# Patient Record
Sex: FEMALE | Race: WHITE | NOT HISPANIC OR LATINO | Employment: FULL TIME | URBAN - METROPOLITAN AREA
[De-identification: names, ages, dates, MRNs, and addresses within clinical notes are randomized per-mention and may not be internally consistent; named-entity substitution may affect disease eponyms.]

---

## 2016-11-30 LAB
ABO + RH BLD: NORMAL
ABO + RH BLD: NORMAL
BLD GP AB SCN SERPL QL: NORMAL
CULTURE MICRO: NEGATIVE
HBV SURFACE AG SERPL QL IA: NEGATIVE
HEMOGLOBIN: 12.7 G/DL (ref 11.7–15.7)
HIV 1+2 AB+HIV1 P24 AG SERPL QL IA: NEGATIVE
PLATELET # BLD AUTO: 293 10^9/L
RUBELLA ANTIBODY IGG QUANTITATIVE: NORMAL IU/ML
T PALLIDUM IGG SER QL: NORMAL

## 2017-03-17 ENCOUNTER — OFFICE VISIT (OUTPATIENT)
Dept: FAMILY MEDICINE | Facility: CLINIC | Age: 32
End: 2017-03-17
Payer: COMMERCIAL

## 2017-03-17 VITALS
DIASTOLIC BLOOD PRESSURE: 74 MMHG | SYSTOLIC BLOOD PRESSURE: 110 MMHG | BODY MASS INDEX: 26.59 KG/M2 | WEIGHT: 175.44 LBS | TEMPERATURE: 99.3 F | HEIGHT: 68 IN | OXYGEN SATURATION: 100 % | HEART RATE: 75 BPM

## 2017-03-17 DIAGNOSIS — J98.01 ACUTE BRONCHOSPASM: ICD-10-CM

## 2017-03-17 DIAGNOSIS — J06.9 VIRAL URI WITH COUGH: Primary | ICD-10-CM

## 2017-03-17 PROCEDURE — 99214 OFFICE O/P EST MOD 30 MIN: CPT | Performed by: FAMILY MEDICINE

## 2017-03-17 RX ORDER — ALBUTEROL SULFATE 90 UG/1
2 AEROSOL, METERED RESPIRATORY (INHALATION) 3 TIMES DAILY PRN
Qty: 1 INHALER | Refills: 1 | Status: ON HOLD | OUTPATIENT
Start: 2017-03-17 | End: 2019-03-20

## 2017-03-17 NOTE — PATIENT INSTRUCTIONS
Viral upper respiratiry tract infection  Steam inhalation- once daily if that helps  Albuterol inhaler three times daily as needed   Wheezing  Tylenol/acetaminophen 1-2 tabs up to three times daily as needed  FOR NEXT 3-5 DAYS  NO IBUPROFEN / NAPROXEN  Call if not getting better

## 2017-03-17 NOTE — MR AVS SNAPSHOT
After Visit Summary   3/17/2017    Melissa Aguirre    MRN: [id number]           Patient Information     Date Of Birth          1985        Visit Information        Provider Department      3/17/2017 2:00 PM Ynes Cardenas MD [facility]        Today's Diagnoses     Viral URI with cough    -  1    Acute bronchospasm          Care Instructions    Viral upper respiratiry tract infection  Steam inhalation- once daily if that helps  Albuterol inhaler three times daily as needed   Wheezing  Tylenol/acetaminophen 1-2 tabs up to three times daily as needed  FOR NEXT 3-5 DAYS  NO IBUPROFEN / NAPROXEN  Call if not getting better          Follow-ups after your visit        Who to contact     If you have questions or need follow up information about today's clinic visit or your schedule please contact [facility] directly at [phone].  Normal or non-critical lab and imaging results will be communicated to you by MyDochart, letter or phone within 4 business days after the clinic has received the results. If you do not hear from us within 7 days, please contact the clinic through MyDochart or phone. If you have a critical or abnormal lab result, we will notify you by phone as soon as possible.  Submit refill requests through HereOrThere or call your pharmacy and they will forward the refill request to us. Please allow 3 business days for your refill to be completed.          Additional Information About Your Visit        MyChart Information     HereOrThere gives you secure access to your electronic health record. If you see a primary care provider, you can also send messages to your care team and make appointments. If you have questions, please call your primary care clinic.  If you do not have a primary care provider, please call [phone] and they will assist you.        Care EveryWhere ID     This is your Care EveryWhere ID. This could be used by other organizations to access your [place]  "medical records  [id number]        Your Vitals Were     Pulse Temperature Height Last Period Pulse Oximetry Breastfeeding?    75 99.3  F (37.4  C) (Tympanic) 5' 8\" (1.727 m) 09/11/2016 100% No    BMI (Body Mass Index)                   26.68 kg/m2            Blood Pressure from Last 3 Encounters:   03/17/17 110/74   11/22/16 104/66   10/14/16 128/80    Weight from Last 3 Encounters:   03/17/17 175 lb 7 oz (79.6 kg)   11/22/16 154 lb 9.6 oz (70.1 kg)   10/14/16 159 lb (72.1 kg)              Today, you had the following     No orders found for display         Today's Medication Changes          These changes are accurate as of: 3/17/17  2:29 PM.  If you have any questions, ask your nurse or doctor.               Start taking these medicines.        Dose/Directions    albuterol 108 (90 BASE) MCG/ACT Inhaler   Commonly known as:  PROAIR HFA/PROVENTIL HFA/VENTOLIN HFA   Used for:  Acute bronchospasm   Started by:  Ynes Cardenas MD        Dose:  2 puff   Inhale 2 puffs into the lungs 3 times daily as needed for shortness of breath / dyspnea or wheezing   Quantity:  1 Inhaler   Refills:  1            Where to get your medicines      These medications were sent to [place] [id number] [place] [facility] [address]  [address] [facility] [address]     Phone:  [phone]     albuterol 108 (90 BASE) MCG/ACT Inhaler                Primary Care Provider Office Phone # Fax #    Taylor Amato [phone]       [address] [address]        Thank you!     Thank you for choosing [facility]  for your care. Our goal is always to provide you with excellent care. Hearing back from our patients is one way we can continue to improve our services. Please take a few minutes to complete the written survey that you may receive in the mail after your visit with us. Thank you!             Your Updated Medication List - Protect others around you: Learn how to " safely use, store and throw away your medicines at www.disposemymeds.org.          This list is accurate as of: 3/17/17  2:29 PM.  Always use your most recent med list.                   Brand Name Dispense Instructions for use    albuterol 108 (90 BASE) MCG/ACT Inhaler    PROAIR HFA/PROVENTIL HFA/VENTOLIN HFA    1 Inhaler    Inhale 2 puffs into the lungs 3 times daily as needed for shortness of breath / dyspnea or wheezing       prenatal multivitamin  plus iron 27-0.8 MG Tabs per tablet     100 tablet    Take 1 tablet by mouth daily

## 2017-03-17 NOTE — PROGRESS NOTES
"  SUBJECTIVE:                                                    Melissa Aguirre is a 31 year old female who presents to clinic today for the following health issues:      RESPIRATORY SYMPTOMS      Duration:   Dry cough with wheezing, scant sputum. No asthma, allergies, nonsmoker    Description  nasal congestion and  SOB     Severity: mild    Accompanying signs and symptoms: Chest Tightness    History (predisposing factors):  26 weeks pregnant    Precipitating or alleviating factors: walking    Therapies tried and outcome:  Guaifenesin and Tylenol last  Dose last night     Expected date of delivery 6/18/17    Uneventful pregnancy    AWAKE AFTER RIBUTSSIN                 Reviewed and updated as needed this visit by Provider           :  Chief Complaint   Patient presents with     Cold Symptoms         Current Outpatient Prescriptions   Medication     albuterol (PROAIR HFA/PROVENTIL HFA/VENTOLIN HFA) 108 (90 BASE) MCG/ACT Inhaler     Prenatal Vit-Fe Fumarate-FA (PRENATAL MULTIVITAMIN  PLUS IRON) 27-0.8 MG TABS     No current facility-administered medications for this visit.        Past medical and family history,medications, allergies and problem list reviewed       ROS: 10 point ROS neg other than the symptoms noted above in the HPI.    EXAM:/74 (BP Location: Left arm, Patient Position: Left side, Cuff Size: Adult Regular)  Pulse 75  Temp 99.3  F (37.4  C) (Tympanic)  Ht 5' 8\" (1.727 m)  Wt 175 lb 7 oz (79.6 kg)  LMP 09/11/2016  SpO2 100%  Breastfeeding? No  BMI 26.68 kg/m2  Constitutional: healthy, alert and no distress   Head: Normocephalic. No masses, lesions, tenderness or abnormalities  Eyes; PERRLA EOMI  Neck: Neck supple. No adenopathy. Thyroid symmetric, normal size  Ear,Nose, clear . Throat: normal, oropharynx  Cardiovascular: negative,  RRR. No murmurs, clicks gallops or rub  Respiratory:  Lungs clear. Does have scattered wheezing, No crackles.  Abdomen: gravid. Height of fundus 26. Good fetal " heart tones  NEURO: Gait normal. Reflexes normal and symmetric. Sensation grossly WNL.  Psychiatric: mentation appears normal and affect normal/bright    ASSESSMENT / PLAN:  (J06.9,  B97.89) Viral URI with cough  (primary encounter diagnosis)and (J98.01) Acute bronchospasm  Plan:Viral upper respiratiry tract infection , symptomatic treatment with   Steam inhalation-, rest , fluids . Due to wheezing, start Albuterol inhaler three times daily as needed   Wheezing  Tylenol/acetaminophen 1-2 tabs up to three times daily as needed    Call if not getting better  No need for antibiotic     She is 26 weeks pregnant and is advised to keep routine follow up with OBG/GYN         Potential medication side effects were discussed with the patient; let me know if any occur.    The patient indicates understanding of these issues and agrees with the plan.      Ynes Cardenas

## 2017-03-17 NOTE — NURSING NOTE
"Chief Complaint   Patient presents with     Cold Symptoms     /74 (BP Location: Left arm, Patient Position: Left side, Cuff Size: Adult Regular)  Pulse 75  Temp 99.3  F (37.4  C) (Tympanic)  Ht 5' 8\" (1.727 m)  Wt 175 lb 7 oz (79.6 kg)  LMP 09/11/2016  SpO2 100%  Breastfeeding? No  BMI 26.68 kg/m2 Estimated body mass index is 26.68 kg/(m^2) as calculated from the following:    Height as of this encounter: 5' 8\" (1.727 m).    Weight as of this encounter: 175 lb 7 oz (79.6 kg).  bp completed using cuff size: regular      Health Maintenance addressed:  NONE    n/a              "

## 2017-05-26 LAB — GROUP B STREP PCR: NEGATIVE

## 2017-06-11 ENCOUNTER — HOSPITAL ENCOUNTER (OUTPATIENT)
Facility: CLINIC | Age: 32
Discharge: HOME | End: 2017-06-11
Attending: OBSTETRICS & GYNECOLOGY | Admitting: OBSTETRICS & GYNECOLOGY
Payer: COMMERCIAL

## 2017-06-11 VITALS
DIASTOLIC BLOOD PRESSURE: 75 MMHG | BODY MASS INDEX: 29.55 KG/M2 | HEIGHT: 68 IN | RESPIRATION RATE: 16 BRPM | TEMPERATURE: 98.4 F | WEIGHT: 195 LBS | SYSTOLIC BLOOD PRESSURE: 129 MMHG

## 2017-06-11 PROCEDURE — 59025 FETAL NON-STRESS TEST: CPT

## 2017-06-11 PROCEDURE — 99213 OFFICE O/P EST LOW 20 MIN: CPT | Mod: 25

## 2017-06-11 RX ORDER — ONDANSETRON 2 MG/ML
4 INJECTION INTRAMUSCULAR; INTRAVENOUS EVERY 6 HOURS PRN
Status: DISCONTINUED | OUTPATIENT
Start: 2017-06-11 | End: 2017-06-11 | Stop reason: HOSPADM

## 2017-06-11 NOTE — IP AVS SNAPSHOT
[facility] Labor and Delivery    [address] [place] [address]    Phone:  [phone]                                       After Visit Summary   6/11/2017    Melissa Aguirre    MRN: [id number]           After Visit Summary Signature Page     I have received my discharge instructions, and my questions have been answered. I have discussed any challenges I see with this plan with the nurse or doctor.    ..........................................................................................................................................  Patient/Patient Representative Signature      ..........................................................................................................................................  Patient Representative Print Name and Relationship to Patient    ..................................................               ................................................  Date                                            Time    ..........................................................................................................................................  Reviewed by Signature/Title    ...................................................              ..............................................  Date                                                            Time

## 2017-06-11 NOTE — DISCHARGE INSTRUCTIONS
Discharge Instruction for Undelivered Patients      You were seen for: Labor Assessment/left sided abdominal pain  We Consulted: Dr. Harrison  You had (Test or Medicine):non stress test monitoring, cervical exam     Diet:   Drink 8 to 12 glasses of liquids (milk, juice, water) every day.  You may eat meals and snacks.     Activity:  Rest.  Activity as tolerated.  Can try warm baths or shower.   Count fetal kicks everyday (see handout)  Call your doctor or nurse midwife if your baby is moving less than usual.     Call your provider if you notice:  Pre-eclampsia symptoms:        Swelling in your face or increased swelling in your hands or legs.        Headaches that are not relieved by Tylenol (acetaminophen).        Changes in your vision (blurring: seeing spots or stars.)        Nausea (sick to your stomach) and vomiting (throwing up).         Weight gain of 5 pounds or more per week.        Heartburn that doesn't go away.  Signs of bladder infection: pain when you urinate (use the toilet), need to go more often and more urgently.  The bag of santoyo (rupture of membranes) breaks, or you notice leaking in your underwear.  Bright red blood in your underwear.  Abdominal (lower belly) or stomach pain--MD wants you to rest and monitor left sided pain this evening and call MD if you feel it has worsened or start having any other symptoms/concerns.  For first baby: Contractions (tightening) less than 5 minutes apart for one hour or more.  Increase or change in vaginal discharge (note the color and amount)      Follow-up:  As scheduled in the clinicon Friday.  If pain continues and you don't feel that it is getting better, then make an appointment to be seen in clinic sooner.

## 2017-06-11 NOTE — IP AVS SNAPSHOT
MRN:[id number]                      After Visit Summary   6/11/2017    Melissa Aguirre    MRN: [id number]           Thank you!     Thank you for choosing [facility] for your care. Our goal is always to provide you with excellent care. Hearing back from our patients is one way we can continue to improve our services. Please take a few minutes to complete the written survey that you may receive in the mail after you visit with us. Thank you!        Patient Information     Date Of Birth          1985        Designated Caregiver       Most Recent Value    Caregiver    Will someone help with your care after discharge? yes    Name of designated caregiver Quinn      About your hospital stay     You were admitted on:  June 11, 2017 You last received care in the:  [facility] Labor and Delivery    You were discharged on:  June 11, 2017       Who to Call     For medical emergencies, please call 911.  For non-urgent questions about your medical care, please call your primary care provider or clinic, [phone]          Attending Provider     Provider Carolynn Holliday MD OB/Gyn       Primary Care Provider Office Phone # Fax #    Taylor Johana Amato [phone]      Further instructions from your care team       Discharge Instruction for Undelivered Patients      You were seen for: Labor Assessment/left sided abdominal pain  We Consulted: Dr. Harrison  You had (Test or Medicine):non stress test monitoring, cervical exam     Diet:   Drink 8 to 12 glasses of liquids (milk, juice, water) every day.  You may eat meals and snacks.     Activity:  Rest.  Activity as tolerated.  Can try warm baths or shower.   Count fetal kicks everyday (see handout)  Call your doctor or nurse midwife if your baby is moving less than usual.     Call your provider if you notice:  Pre-eclampsia symptoms:        Swelling in your face or increased swelling in your hands or legs.        Headaches  "that are not relieved by Tylenol (acetaminophen).        Changes in your vision (blurring: seeing spots or stars.)        Nausea (sick to your stomach) and vomiting (throwing up).         Weight gain of 5 pounds or more per week.        Heartburn that doesn't go away.  Signs of bladder infection: pain when you urinate (use the toilet), need to go more often and more urgently.  The bag of santoyo (rupture of membranes) breaks, or you notice leaking in your underwear.  Bright red blood in your underwear.  Abdominal (lower belly) or stomach pain--MD wants you to rest and monitor left sided pain this evening and call MD if you feel it has worsened or start having any other symptoms/concerns.  For first baby: Contractions (tightening) less than 5 minutes apart for one hour or more.  Increase or change in vaginal discharge (note the color and amount)      Follow-up:  As scheduled in the clinicon Friday.  If pain continues and you don't feel that it is getting better, then make an appointment to be seen in clinic sooner.          Pending Results     No orders found from 6/9/2017 to 6/12/2017.            Admission Information     Date & Time Provider Department Dept. Phone    6/11/2017 Carolynn Harrison MD [facility] Labor and Delivery [phone]      Your Vitals Were     Blood Pressure Temperature Respirations Height Weight Last Period    129/75 98.4  F (36.9  C) (Temporal) 16 1.727 m (5' 8\") 88.5 kg (195 lb) 09/11/2016    BMI (Body Mass Index)                   29.65 kg/m2           [employer] Information     [employer] gives you secure access to your electronic health record. If you see a primary care provider, you can also send messages to your care team and make appointments. If you have questions, please call your primary care clinic.  If you do not have a primary care provider, please call [phone] and they will assist you.        Care EveryWhere ID     This is your Care EveryWhere ID. This could be used " by other organizations to access your [place] medical records  [id number]           Review of your medicines      UNREVIEWED medicines. Ask your doctor about these medicines        Dose / Directions    albuterol 108 (90 BASE) MCG/ACT Inhaler   Commonly known as:  PROAIR HFA/PROVENTIL HFA/VENTOLIN HFA   Used for:  Acute bronchospasm        Dose:  2 puff   Inhale 2 puffs into the lungs 3 times daily as needed for shortness of breath / dyspnea or wheezing   Quantity:  1 Inhaler   Refills:  1       prenatal multivitamin  plus iron 27-0.8 MG Tabs per tablet        Dose:  1 tablet   Take 1 tablet by mouth daily   Quantity:  100 tablet   Refills:  3                Protect others around you: Learn how to safely use, store and throw away your medicines at www.disposemymeds.org.             Medication List: This is a list of all your medications and when to take them. Check marks below indicate your daily home schedule. Keep this list as a reference.      Medications           Morning Afternoon Evening Bedtime As Needed    albuterol 108 (90 BASE) MCG/ACT Inhaler   Commonly known as:  PROAIR HFA/PROVENTIL HFA/VENTOLIN HFA   Inhale 2 puffs into the lungs 3 times daily as needed for shortness of breath / dyspnea or wheezing                                prenatal multivitamin  plus iron 27-0.8 MG Tabs per tablet   Take 1 tablet by mouth daily

## 2017-06-11 NOTE — PLAN OF CARE
"1558:  Primip triaged in rm 235.  Pt came in to be evaluated for c/o left sided \"sharp\" abdominal pain that started about \"45 minutes ago\" while out for a walk.  Stated that the pain is worse when she moves and goes away with resting still. Pt reports having asuncion-foreman contractions for many days and denies stronger contractions.  Pt stated feeling baby movements.  Pt denies vaginal bleeding, visual disturbances, epigastric pain, or increased swelling.  No edema to ankles, has +1 edema to shins.  Reflexes +2, no clonus.  Pt denies burning with urination.  Pt reports normal BMs.   Discussed POC.  Pt and  wish to proceed.  Monitors applied.  Abdomen non tender to palpation.  History obtained. Pt stated cervix on Fri was 2/60%.  1620:  Couple BH contractions on monitor, which pt able to talk through.  SVE by RN.  Cervix 1+/60/-2. No vaginal discharge or bleeding noted.   Pt's description of pain sounds musculoskeletal, like a side ache or pulled muscle.  Will update MD for any additional orders.  Cat 1 FHT.  "

## 2017-06-11 NOTE — PLAN OF CARE
Data: [facility]   Reason for maternal/fetal assessment per patient is Abdominal Pain (left sided)  . Patient is a . Prenatal record reviewed.      Obstetric History       T0      L0     SAB0   TAB0   Ectopic0   Multiple0   Live Births0       # Outcome Date GA Lbr Silvestre/2nd Weight Sex Delivery Anes PTL Lv   1 Current                  Medical History:   Past Medical History:   Diagnosis Date     IUD (intrauterine device) in place -    actinomyces seen on pap 2012-pt asymptomatic      NO ACTIVE PROBLEMS    . Gestational Age 39w0d. VSS. Cervix: dilated to 1+/60/-2.  Fetal movement present. Patient denies cramping, backache, vaginal discharge, pelvic pressure, UTI symptoms, GI problems, bloody show, vaginal bleeding, edema, headache, visual disturbances, epigastric or URQ pain, abdominal pain, rupture of membranes. Support persons Quinn present.  Action: Verbal consent for EFM. Triage assessment completed. EFM applied for NST. Uterine assessment abd soft and non tender to palpation.  Having asuncion foreman contractions on monitor, which pt reports she's been having for many days. Fetal assessment: Presumed adequate fetal oxygenation documented (see flow record). Patient education on fetal movement counts reviewed. Patient instructed to report change in fetal movement, vaginal leaking of fluid or bleeding, abdominal pain, or any concerns related to the pregnancy to her nurse/physician.   Response: Dr. Harrison informed of pt status, SVE, NST, BH contractions. Plan per provider is discharge to home to rest.  Pt to call MD if discomfort worsens and to been seen in clinic sooner than Fri if she feels symptoms don't improve with rest. Patient verbalized understanding of education and verbalized agreement with plan. Discharged ambulatory at 1705.

## 2017-06-11 NOTE — PROVIDER NOTIFICATION
MD updated on pt status.  MD feels pt should go home, rest, and monitor discomfort.  MD stated that pt should be seen in clinic if pain worsens before her scheduled appointment on Fri.  Pt notified and is happy to be able to go home.  She quickly sat up in bed without any obvious signs of discomfort.

## 2017-06-17 ENCOUNTER — ANESTHESIA EVENT (OUTPATIENT)
Dept: OBGYN | Facility: CLINIC | Age: 32
End: 2017-06-17
Payer: COMMERCIAL

## 2017-06-17 ENCOUNTER — ANESTHESIA (OUTPATIENT)
Dept: OBGYN | Facility: CLINIC | Age: 32
End: 2017-06-17
Payer: COMMERCIAL

## 2017-06-17 ENCOUNTER — HOSPITAL ENCOUNTER (INPATIENT)
Facility: CLINIC | Age: 32
LOS: 3 days | Discharge: HOME | End: 2017-06-20
Attending: OBSTETRICS & GYNECOLOGY | Admitting: OBSTETRICS & GYNECOLOGY
Payer: COMMERCIAL

## 2017-06-17 LAB
ABO + RH BLD: NORMAL
ABO + RH BLD: NORMAL
PLATELET # BLD AUTO: 280 10E9/L (ref 150–450)
SPECIMEN EXP DATE BLD: NORMAL

## 2017-06-17 PROCEDURE — 3E0R3CZ INTRODUCTION OF REGIONAL ANESTHETIC INTO SPINAL CANAL, PERCUTANEOUS APPROACH: ICD-10-PCS | Performed by: ANESTHESIOLOGY

## 2017-06-17 PROCEDURE — 99215 OFFICE O/P EST HI 40 MIN: CPT | Mod: 25

## 2017-06-17 PROCEDURE — 25000128 H RX IP 250 OP 636: Performed by: ANESTHESIOLOGY

## 2017-06-17 PROCEDURE — 00HU33Z INSERTION OF INFUSION DEVICE INTO SPINAL CANAL, PERCUTANEOUS APPROACH: ICD-10-PCS | Performed by: ANESTHESIOLOGY

## 2017-06-17 PROCEDURE — 86901 BLOOD TYPING SEROLOGIC RH(D): CPT | Performed by: OBSTETRICS & GYNECOLOGY

## 2017-06-17 PROCEDURE — 37000011 ZZH ANESTHESIA WARD SERVICE

## 2017-06-17 PROCEDURE — 25000125 ZZHC RX 250: Performed by: ANESTHESIOLOGY

## 2017-06-17 PROCEDURE — 85049 AUTOMATED PLATELET COUNT: CPT | Performed by: OBSTETRICS & GYNECOLOGY

## 2017-06-17 PROCEDURE — 59025 FETAL NON-STRESS TEST: CPT

## 2017-06-17 PROCEDURE — 86780 TREPONEMA PALLIDUM: CPT | Performed by: OBSTETRICS & GYNECOLOGY

## 2017-06-17 PROCEDURE — 12000029 ZZH R&B OB INTERMEDIATE

## 2017-06-17 PROCEDURE — 86900 BLOOD TYPING SEROLOGIC ABO: CPT | Performed by: OBSTETRICS & GYNECOLOGY

## 2017-06-17 PROCEDURE — 25000128 H RX IP 250 OP 636: Performed by: OBSTETRICS & GYNECOLOGY

## 2017-06-17 PROCEDURE — 36415 COLL VENOUS BLD VENIPUNCTURE: CPT | Performed by: OBSTETRICS & GYNECOLOGY

## 2017-06-17 RX ORDER — CARBOPROST TROMETHAMINE 250 UG/ML
250 INJECTION, SOLUTION INTRAMUSCULAR
Status: DISCONTINUED | OUTPATIENT
Start: 2017-06-17 | End: 2017-06-18

## 2017-06-17 RX ORDER — OXYCODONE AND ACETAMINOPHEN 5; 325 MG/1; MG/1
1 TABLET ORAL
Status: DISCONTINUED | OUTPATIENT
Start: 2017-06-17 | End: 2017-06-18

## 2017-06-17 RX ORDER — EPHEDRINE SULFATE 50 MG/ML
5 INJECTION, SOLUTION INTRAMUSCULAR; INTRAVENOUS; SUBCUTANEOUS
Status: DISCONTINUED | OUTPATIENT
Start: 2017-06-17 | End: 2017-06-18

## 2017-06-17 RX ORDER — IBUPROFEN 800 MG/1
800 TABLET, FILM COATED ORAL
Status: DISCONTINUED | OUTPATIENT
Start: 2017-06-17 | End: 2017-06-18

## 2017-06-17 RX ORDER — ROPIVACAINE HYDROCHLORIDE 2 MG/ML
10 INJECTION, SOLUTION EPIDURAL; INFILTRATION; PERINEURAL ONCE
Status: COMPLETED | OUTPATIENT
Start: 2017-06-17 | End: 2017-06-17

## 2017-06-17 RX ORDER — FENTANYL CITRATE 50 UG/ML
100 INJECTION, SOLUTION INTRAMUSCULAR; INTRAVENOUS ONCE
Status: COMPLETED | OUTPATIENT
Start: 2017-06-17 | End: 2017-06-17

## 2017-06-17 RX ORDER — SODIUM CHLORIDE, SODIUM LACTATE, POTASSIUM CHLORIDE, CALCIUM CHLORIDE 600; 310; 30; 20 MG/100ML; MG/100ML; MG/100ML; MG/100ML
INJECTION, SOLUTION INTRAVENOUS CONTINUOUS
Status: DISCONTINUED | OUTPATIENT
Start: 2017-06-17 | End: 2017-06-18

## 2017-06-17 RX ORDER — NALOXONE HYDROCHLORIDE 0.4 MG/ML
.1-.4 INJECTION, SOLUTION INTRAMUSCULAR; INTRAVENOUS; SUBCUTANEOUS
Status: DISCONTINUED | OUTPATIENT
Start: 2017-06-17 | End: 2017-06-18

## 2017-06-17 RX ORDER — ONDANSETRON 2 MG/ML
4 INJECTION INTRAMUSCULAR; INTRAVENOUS EVERY 6 HOURS PRN
Status: DISCONTINUED | OUTPATIENT
Start: 2017-06-17 | End: 2017-06-18

## 2017-06-17 RX ORDER — OXYTOCIN 10 [USP'U]/ML
10 INJECTION, SOLUTION INTRAMUSCULAR; INTRAVENOUS
Status: DISCONTINUED | OUTPATIENT
Start: 2017-06-17 | End: 2017-06-18

## 2017-06-17 RX ORDER — NALBUPHINE HYDROCHLORIDE 10 MG/ML
2.5-5 INJECTION, SOLUTION INTRAMUSCULAR; INTRAVENOUS; SUBCUTANEOUS EVERY 6 HOURS PRN
Status: DISCONTINUED | OUTPATIENT
Start: 2017-06-17 | End: 2017-06-18

## 2017-06-17 RX ORDER — METHYLERGONOVINE MALEATE 0.2 MG/ML
200 INJECTION INTRAVENOUS
Status: DISCONTINUED | OUTPATIENT
Start: 2017-06-17 | End: 2017-06-18

## 2017-06-17 RX ORDER — ACETAMINOPHEN 325 MG/1
650 TABLET ORAL EVERY 4 HOURS PRN
Status: DISCONTINUED | OUTPATIENT
Start: 2017-06-17 | End: 2017-06-18

## 2017-06-17 RX ORDER — OXYTOCIN/0.9 % SODIUM CHLORIDE 30/500 ML
100-340 PLASTIC BAG, INJECTION (ML) INTRAVENOUS CONTINUOUS PRN
Status: DISCONTINUED | OUTPATIENT
Start: 2017-06-17 | End: 2017-06-18

## 2017-06-17 RX ADMIN — SODIUM CHLORIDE, POTASSIUM CHLORIDE, SODIUM LACTATE AND CALCIUM CHLORIDE 1000 ML: 600; 310; 30; 20 INJECTION, SOLUTION INTRAVENOUS at 20:30

## 2017-06-17 RX ADMIN — ROPIVACAINE HYDROCHLORIDE 3 ML: 2 INJECTION, SOLUTION EPIDURAL; INFILTRATION at 19:48

## 2017-06-17 RX ADMIN — FENTANYL CITRATE 100 MCG: 50 INJECTION, SOLUTION INTRAMUSCULAR; INTRAVENOUS at 19:50

## 2017-06-17 RX ADMIN — Medication 12 ML/HR: at 19:40

## 2017-06-17 RX ADMIN — ROPIVACAINE HYDROCHLORIDE 3 ML: 2 INJECTION, SOLUTION EPIDURAL; INFILTRATION at 19:49

## 2017-06-17 RX ADMIN — SODIUM CHLORIDE, POTASSIUM CHLORIDE, SODIUM LACTATE AND CALCIUM CHLORIDE 1000 ML: 600; 310; 30; 20 INJECTION, SOLUTION INTRAVENOUS at 19:21

## 2017-06-17 RX ADMIN — ROPIVACAINE HYDROCHLORIDE 4 ML: 2 INJECTION, SOLUTION EPIDURAL; INFILTRATION at 19:51

## 2017-06-17 NOTE — IP AVS SNAPSHOT
[address]    [place] [address]    Phone:  [phone]                                       After Visit Summary   6/17/2017    Melissa Aguirre    MRN: [id number]           After Visit Summary Signature Page     I have received my discharge instructions, and my questions have been answered. I have discussed any challenges I see with this plan with the nurse or doctor.    ..........................................................................................................................................  Patient/Patient Representative Signature      ..........................................................................................................................................  Patient Representative Print Name and Relationship to Patient    ..................................................               ................................................  Date                                            Time    ..........................................................................................................................................  Reviewed by Signature/Title    ...................................................              ..............................................  Date                                                            Time

## 2017-06-17 NOTE — IP AVS SNAPSHOT
MRN:[id number]                      After Visit Summary   6/17/2017    Melissa Aguirre    MRN: [id number]           Thank you!     Thank you for choosing [facility] for your care. Our goal is always to provide you with excellent care. Hearing back from our patients is one way we can continue to improve our services. Please take a few minutes to complete the written survey that you may receive in the mail after you visit with us. Thank you!        Patient Information     Date Of Birth          1985        About your hospital stay     You were admitted on:  June 17, 2017 You last received care in the:  [address]    You were discharged on:  June 20, 2017       Who to Call     For medical emergencies, please call 911.  For non-urgent questions about your medical care, please call your primary care provider or clinic, [phone]          Attending Provider     Provider Helen Meza MD OB/Gyn       Primary Care Provider Office Phone # Fax #    Taylor Amato [phone]      After Care Instructions     Activity       Review discharge instructions            Diet       Resume previous diet            Discharge Instructions - Postpartum visit       Schedule postpartum visit with your provider and return to clinic in 6 weeks.                  Further instructions from your care team       Postpartum Vaginal Delivery Instructions    Activity       Ask family and friends for help when you need it.    Do not place anything in your vagina for 6 weeks.    You are not restricted on other activities, but take it easy for a few weeks to allow your body to recover from delivery.  You are able to do any activities you feel up to that point.    No driving until you have stopped taking your pain medications (usually two weeks after delivery).     Call your health care provider if you have any of these symptoms:       Increased pain,  swelling, redness, or fluid around your stiches from an episiotomy or perineal tear.    A fever above 100.4 F (38 C) with or without chills when placing a thermometer under your tongue.    You soak a sanitary pad with blood within 1 hour, or you see blood clots larger than a golf ball.    Bleeding that lasts more than 6 weeks.    Vaginal discharge that smells bad.    Severe pain, cramping or tenderness in your lower belly area.    A need to urinate more frequently (use the toilet more often), more urgently (use the toilet very quickly), or it burns when you urinate.    Nausea and vomiting.    Redness, swelling or pain around a vein in your leg.    Problems breastfeeding or a red or painful area on your breast.    Chest pain and cough or are gasping for air.    Problems coping with sadness, anxiety, or depression.  If you have any concerns about hurting yourself or the baby, call your provider immediately.     You have questions or concerns after you return home.     Keep your hands clean:  Always wash your hands before touching your perineal area and stitches.  This helps reduce your risk of infection.  If your hands aren't dirty, you may use an alcohol hand-rub to clean your hands. Keep your nails clean and short.        Pending Results     No orders found from 6/15/2017 to 6/18/2017.            Statement of Approval     Ordered          06/20/17 0820  I have reviewed and agree with all the recommendations and orders detailed in this document.  EFFECTIVE NOW     Approved and electronically signed by:  Carolynn Harrison MD             Admission Information     Date & Time Provider Department Dept. Phone    6/17/2017 Helen Patterson MD [address] [phone]      Your Vitals Were     Blood Pressure Pulse Temperature Respirations Last Period Pulse Oximetry    119/78 85 97.7  F (36.5  C) (Oral) 16 09/11/2016 99%      MyChart Information     [facility] gives you secure  access to your electronic health record. If you see a primary care provider, you can also send messages to your care team and make appointments. If you have questions, please call your primary care clinic.  If you do not have a primary care provider, please call [phone] and they will assist you.        Care EveryWhere ID     This is your Care EveryWhere ID. This could be used by other organizations to access your [facility] medical records  [id number]           Review of your medicines      CONTINUE these medicines which have NOT CHANGED        Dose / Directions    albuterol 108 (90 BASE) MCG/ACT Inhaler   Commonly known as:  PROAIR HFA/PROVENTIL HFA/VENTOLIN HFA   Used for:  Acute bronchospasm        Dose:  2 puff   Inhale 2 puffs into the lungs 3 times daily as needed for shortness of breath / dyspnea or wheezing   Quantity:  1 Inhaler   Refills:  1       prenatal multivitamin  plus iron 27-0.8 MG Tabs per tablet        Dose:  1 tablet   Take 1 tablet by mouth daily   Quantity:  100 tablet   Refills:  3                Protect others around you: Learn how to safely use, store and throw away your medicines at www.disposemymeds.org.             Medication List: This is a list of all your medications and when to take them. Check marks below indicate your daily home schedule. Keep this list as a reference.      Medications           Morning Afternoon Evening Bedtime As Needed    albuterol 108 (90 BASE) MCG/ACT Inhaler   Commonly known as:  PROAIR HFA/PROVENTIL HFA/VENTOLIN HFA   Inhale 2 puffs into the lungs 3 times daily as needed for shortness of breath / dyspnea or wheezing                                prenatal multivitamin  plus iron 27-0.8 MG Tabs per tablet   Take 1 tablet by mouth daily

## 2017-06-18 LAB — T PALLIDUM IGG+IGM SER QL: NEGATIVE

## 2017-06-18 PROCEDURE — 0KQM0ZZ REPAIR PERINEUM MUSCLE, OPEN APPROACH: ICD-10-PCS | Performed by: OBSTETRICS & GYNECOLOGY

## 2017-06-18 PROCEDURE — 25000125 ZZHC RX 250: Performed by: OBSTETRICS & GYNECOLOGY

## 2017-06-18 PROCEDURE — 72200001 ZZH LABOR CARE VAGINAL DELIVERY SINGLE

## 2017-06-18 PROCEDURE — 12000037 ZZH R&B POSTPARTUM INTERMEDIATE

## 2017-06-18 PROCEDURE — 25000132 ZZH RX MED GY IP 250 OP 250 PS 637: Performed by: OBSTETRICS & GYNECOLOGY

## 2017-06-18 RX ORDER — OXYTOCIN/0.9 % SODIUM CHLORIDE 30/500 ML
100 PLASTIC BAG, INJECTION (ML) INTRAVENOUS CONTINUOUS
Status: DISCONTINUED | OUTPATIENT
Start: 2017-06-18 | End: 2017-06-20 | Stop reason: HOSPADM

## 2017-06-18 RX ORDER — AMOXICILLIN 250 MG
1-2 CAPSULE ORAL 2 TIMES DAILY
Status: DISCONTINUED | OUTPATIENT
Start: 2017-06-18 | End: 2017-06-20 | Stop reason: HOSPADM

## 2017-06-18 RX ORDER — LIDOCAINE 40 MG/G
CREAM TOPICAL
Status: DISCONTINUED | OUTPATIENT
Start: 2017-06-18 | End: 2017-06-18

## 2017-06-18 RX ORDER — MISOPROSTOL 200 UG/1
400 TABLET ORAL
Status: DISCONTINUED | OUTPATIENT
Start: 2017-06-18 | End: 2017-06-20 | Stop reason: HOSPADM

## 2017-06-18 RX ORDER — BISACODYL 10 MG
10 SUPPOSITORY, RECTAL RECTAL DAILY PRN
Status: DISCONTINUED | OUTPATIENT
Start: 2017-06-20 | End: 2017-06-20 | Stop reason: HOSPADM

## 2017-06-18 RX ORDER — ACETAMINOPHEN 325 MG/1
650 TABLET ORAL EVERY 4 HOURS PRN
Status: DISCONTINUED | OUTPATIENT
Start: 2017-06-18 | End: 2017-06-20 | Stop reason: HOSPADM

## 2017-06-18 RX ORDER — OXYTOCIN/0.9 % SODIUM CHLORIDE 30/500 ML
1-24 PLASTIC BAG, INJECTION (ML) INTRAVENOUS CONTINUOUS
Status: DISCONTINUED | OUTPATIENT
Start: 2017-06-18 | End: 2017-06-18

## 2017-06-18 RX ORDER — NALOXONE HYDROCHLORIDE 0.4 MG/ML
.1-.4 INJECTION, SOLUTION INTRAMUSCULAR; INTRAVENOUS; SUBCUTANEOUS
Status: DISCONTINUED | OUTPATIENT
Start: 2017-06-18 | End: 2017-06-20 | Stop reason: HOSPADM

## 2017-06-18 RX ORDER — LANOLIN 100 %
OINTMENT (GRAM) TOPICAL
Status: DISCONTINUED | OUTPATIENT
Start: 2017-06-18 | End: 2017-06-20 | Stop reason: HOSPADM

## 2017-06-18 RX ORDER — IBUPROFEN 400 MG/1
400-800 TABLET, FILM COATED ORAL EVERY 6 HOURS PRN
Status: DISCONTINUED | OUTPATIENT
Start: 2017-06-18 | End: 2017-06-20 | Stop reason: HOSPADM

## 2017-06-18 RX ORDER — HYDROCORTISONE 2.5 %
CREAM (GRAM) TOPICAL 3 TIMES DAILY PRN
Status: DISCONTINUED | OUTPATIENT
Start: 2017-06-18 | End: 2017-06-20 | Stop reason: HOSPADM

## 2017-06-18 RX ORDER — OXYTOCIN/0.9 % SODIUM CHLORIDE 30/500 ML
340 PLASTIC BAG, INJECTION (ML) INTRAVENOUS CONTINUOUS PRN
Status: DISCONTINUED | OUTPATIENT
Start: 2017-06-18 | End: 2017-06-20 | Stop reason: HOSPADM

## 2017-06-18 RX ORDER — OXYTOCIN 10 [USP'U]/ML
10 INJECTION, SOLUTION INTRAMUSCULAR; INTRAVENOUS
Status: DISCONTINUED | OUTPATIENT
Start: 2017-06-18 | End: 2017-06-20 | Stop reason: HOSPADM

## 2017-06-18 RX ADMIN — SENNOSIDES AND DOCUSATE SODIUM 1 TABLET: 8.6; 5 TABLET ORAL at 20:01

## 2017-06-18 RX ADMIN — ACETAMINOPHEN 650 MG: 325 TABLET, FILM COATED ORAL at 07:44

## 2017-06-18 RX ADMIN — ACETAMINOPHEN 650 MG: 325 TABLET, FILM COATED ORAL at 13:33

## 2017-06-18 RX ADMIN — ACETAMINOPHEN 650 MG: 325 TABLET, FILM COATED ORAL at 23:56

## 2017-06-18 RX ADMIN — ACETAMINOPHEN 650 MG: 325 TABLET, FILM COATED ORAL at 20:01

## 2017-06-18 RX ADMIN — IBUPROFEN 800 MG: 400 TABLET ORAL at 13:33

## 2017-06-18 RX ADMIN — IBUPROFEN 800 MG: 400 TABLET ORAL at 20:01

## 2017-06-18 RX ADMIN — Medication 2 MILLI-UNITS/MIN: at 01:45

## 2017-06-18 RX ADMIN — SENNOSIDES AND DOCUSATE SODIUM 1 TABLET: 8.6; 5 TABLET ORAL at 09:35

## 2017-06-18 RX ADMIN — IBUPROFEN 800 MG: 400 TABLET ORAL at 07:43

## 2017-06-18 NOTE — PLAN OF CARE
Problem: Labor (Cervical Ripen, Induct, Augment) (Adult,Obstetrics,Pediatric)  Goal: Signs and Symptoms of Listed Potential Problems Will be Absent or Manageable (Labor)  Signs and symptoms of listed potential problems will be absent or manageable by discharge/transition of care (reference Labor (Cervical Ripen, Induct, Augment) (Adult,Obstetrics,Pediatric) CPG).   Outcome: Improving  Making cervical changes  Comfortable with epidural  Excited about birth

## 2017-06-18 NOTE — PLAN OF CARE
Problem: Goal Outcome Summary  Goal: Goal Outcome Summary  Outcome: No Change  Vital signs stable. Up ad simon. Voiding without difficulties. Saline lock removed. Using ice and tucks. Taking tylenol and ibuprofen for pain control. Showered. Breast feeding attempts, doing skin to skin. Encouraged to call with any questions or concerns. Will continue to monitor.

## 2017-06-18 NOTE — PLAN OF CARE
Data: Melissa KIRKLAND Tozer up to BR, unable to void at this time, Uterus firm and slightly deviated to the right but has been her entire recovery, scant rubra, slightly dizzy when sitting up. Transferred to 414 via wheelchair at 0815. Baby transferred via parent's arms.  Action: Receiving unit notified of transfer: Yes. Patient and family notified of room change. Report given to Colleen RN at 0820. Belongings sent to receiving unit. Accompanied by Registered Nurse. Oriented patient to surroundings. Call light within reach.  Response: Patient tolerated transfer and is stable.

## 2017-06-18 NOTE — PLAN OF CARE
Pt having recurrent late and variable decels with pushing.  Pit off, O2 on, fluid bolus and reposition.  Dr Patterson called to room for delivery.  NNP called to room for standby for vacuum assisted delivery.

## 2017-06-18 NOTE — ANESTHESIA PROCEDURE NOTES
Peripheral nerve/Neuraxial procedure note : epidural catheter  Pre-Procedure  Performed by ANNA GREGORIO  Location: OB      Pre-Anesthestic Checklist: patient identified, IV checked, site marked, risks and benefits discussed, informed consent, monitors and equipment checked, pre-op evaluation, at physician/surgeon's request and post-op pain management    Timeout  Correct Patient: Yes   Correct Procedure: Yes   Correct Site: Yes   Correct Laterality: Yes   Correct Position: Yes   Site Marked: Yes   .   Procedure Documentation    Diagnosis:Labor Pain.    Procedure:    Epidural catheter.  Insertion Site:L2-3  (midline approach) Injection technique: LORT saline   Local skin infiltrated with 4 mL of 1% lidocaine.  CRISTIANE at 5 cm     Patient Prep;mask, sterile gloves, povidone-iodine 7.5% surgical scrub, patient draped.  .  Needle: Touhy needle (17 G. 3.5 in). # of attempts: 1.  # of redirects: 1.  Spinal Needle: . . . Catheter: 19 G . .  Catheter threaded easily  4 cm epidural space.  9 cm at skin.   .    Assessment/Narrative  Paresthesias: No.  .  .  Aspiration negative for heme or CSF  . Test dose of 3 mL lidocaine 1.5% w/ 1:200,000 epinephrine at. Test dose negative for signs of intravascular, subdural or intrathecal injection. Comments:  Patient tolerated procedure well

## 2017-06-18 NOTE — ANESTHESIA PREPROCEDURE EVALUATION
Anesthesia Evaluation       history and physical reviewed .             ROS/MED HX    ENT/Pulmonary:       Neurologic:       Cardiovascular:         METS/Exercise Tolerance:     Hematologic:         Musculoskeletal:         GI/Hepatic:         Renal/Genitourinary:         Endo:         Psychiatric:         Infectious Disease:         Malignancy:         Other:                                    Anesthesia Plan      History & Physical Review      ASA Status:  .  OB Epidural Asa: 2            Postoperative Care      Consents                          .

## 2017-06-18 NOTE — H&P
[facility] Labor and Delivery Progress Note    Melissa Aguirre MRN# [id number]   Age: 32 year old YOB: 1985     Refer to prenatal record for full H&P        Subjective:      admitted in early labor and with SROM; uncomplicated prenatal course          Objective:       Cervical Exam: 10  100 /+3 and pushing  Membranes: Leaking     Fetal Heart Rate:    Monitor: External US    Variability: Moderate    Baseline Rate: 155 bpm    Fetal Heart Rate Tracing: cat II ( occ late decelerations)        Assessment:   1)  IUP at  40w0d admitted in active labor  2) Pitocin augmentation  2)  Continue pushing         Plan:   Anticipate     Helen Patterson MD

## 2017-06-18 NOTE — L&D DELIVERY NOTE
HISTORY OF PRESENT ILLNESS:  Ms. Melissa Aguirre is a 32-year-old  1, para 0 who presented at 39 weeks 6 days gestation with spontaneous rupture of membranes and active labor.  The patient's prenatal course was followed at [facility] without complication.      The patient's blood type was A positive, GBS negative, serology nonreactive, rubella immune.        ALLERGY:  Amoxicillin, penicillin.      PAST MEDICAL AND SURGICAL HISTORY:  None contributory.      HOSPITAL COURSE:  The patient was admitted on the evening of 2017. On initial evaluation, the patient had a category 1 fetal heart tracing, stable maternal vital signs, was 5 cm dilated with spontaneous rupture of membranes with clear fluid noted.  She was admitted and received an epidural for pain and did receive Pitocin augmentation and was noted to be completely dilated at 2330.  She was allowed to labor down for 2 hours.  At this time, fetal vertex was noted to be still at 0 station.  The patient did not have an urge to push and was allowed to continue to labor down.  She did begin active second stage of labor with maternal expulsive efforts at 2:50 a.m.      I was called in to evaluate the patient, approximately 3:30 a.m. due to some episodes of late decelerations and concern about fetal station.  I arrived at the bedside; at that time there was category 2 fetal heart tracing with good variability, occasional decelerations and a baseline in the 140s.  On my exam, patient was +3 station and the fetal vertex well applied with a mild amount of caput.  The patient was doing well with pushing.  Recommendation to continue Pitocin augmentation as well as second stage.      I was called to the bedside at approximately 4:40 a.m. due to an episode of deep decelerations with slow return to baseline.  When I arrived at the bedside, the patient was +4 with caput and baseline had just elevated to 170 with minimal variability.  At this time, I discussed  with the patient my concern that we need to facilitate delivery and recommended delivery with the vacuum.      I discussed the use of vacuum including alternatives which include , or continuing to push.  We did discuss risk of serious injury to the infant, 1 in 500 with the use of vacuum.      Vacuum was applied somewhat posteriorly where there was not a significant amount of caput and over the direct suture line.  With the next contraction, 1 pull was used to deliver the fetal vertex over an intact perineum loose nuchal cord was noted which was reduced.  The anterior shoulder was delivered and the remainder of the infant was delivered.  Cord clamping was performed.  Delivery time was 5:12 a.m.      FINDINGS:  Male infant, weight of 7 pounds 8 ounces and Apgars of 8 at 1 minute and 9 at 5 minutes.      Placenta delivered at 5:13 a.m. spontaneously and intact.  Vigorous fundal massage and IV Pitocin were given and excellent uterine tone was noted.      Examination of the perineum showed there to be a second-degree tear as well as bilateral vaginal sulcus tears with a moderate amount of edema.  These were repaired in the normal fashion using 3-0 Vicryl.      At the completion of the procedure, total EBL was 300 cc.  Sponge and needle counts were correct x2.  Mom and baby were doing well in recovery.  Of note, there was 1 pull used with the Kiwi; there were no pop-offs and total accrued time was less than 15 seconds.         INGRIS GARCIA MD             D: 2017 06:08   T: 2017 16:09   MT: [id number]      Name:     SHERWIN SIFUENTES   MRN:      [id number]        Account:        [id number]   :      1985           Delivery Date:  2017      Document: [id number]       cc: [facility]

## 2017-06-19 LAB — HGB BLD-MCNC: 8.9 G/DL (ref 11.7–15.7)

## 2017-06-19 PROCEDURE — 36415 COLL VENOUS BLD VENIPUNCTURE: CPT | Performed by: OBSTETRICS & GYNECOLOGY

## 2017-06-19 PROCEDURE — 12000037 ZZH R&B POSTPARTUM INTERMEDIATE

## 2017-06-19 PROCEDURE — 25000132 ZZH RX MED GY IP 250 OP 250 PS 637: Performed by: OBSTETRICS & GYNECOLOGY

## 2017-06-19 PROCEDURE — 85018 HEMOGLOBIN: CPT | Performed by: OBSTETRICS & GYNECOLOGY

## 2017-06-19 PROCEDURE — 25000132 ZZH RX MED GY IP 250 OP 250 PS 637: Performed by: MIDWIFE

## 2017-06-19 RX ORDER — OXYCODONE HYDROCHLORIDE 5 MG/1
5 TABLET ORAL EVERY 4 HOURS PRN
Status: DISCONTINUED | OUTPATIENT
Start: 2017-06-19 | End: 2017-06-20 | Stop reason: HOSPADM

## 2017-06-19 RX ADMIN — OXYCODONE HYDROCHLORIDE 5 MG: 5 TABLET ORAL at 16:13

## 2017-06-19 RX ADMIN — ACETAMINOPHEN 650 MG: 325 TABLET, FILM COATED ORAL at 06:50

## 2017-06-19 RX ADMIN — OXYCODONE HYDROCHLORIDE 5 MG: 5 TABLET ORAL at 09:32

## 2017-06-19 RX ADMIN — ACETAMINOPHEN 650 MG: 325 TABLET, FILM COATED ORAL at 14:56

## 2017-06-19 RX ADMIN — ACETAMINOPHEN 650 MG: 325 TABLET, FILM COATED ORAL at 11:09

## 2017-06-19 RX ADMIN — ACETAMINOPHEN 650 MG: 325 TABLET, FILM COATED ORAL at 20:53

## 2017-06-19 RX ADMIN — IBUPROFEN 800 MG: 400 TABLET ORAL at 20:53

## 2017-06-19 RX ADMIN — IBUPROFEN 800 MG: 400 TABLET ORAL at 02:51

## 2017-06-19 RX ADMIN — IBUPROFEN 800 MG: 400 TABLET ORAL at 14:56

## 2017-06-19 RX ADMIN — IBUPROFEN 800 MG: 400 TABLET ORAL at 09:29

## 2017-06-19 RX ADMIN — SENNOSIDES AND DOCUSATE SODIUM 1 TABLET: 8.6; 5 TABLET ORAL at 09:29

## 2017-06-19 RX ADMIN — SENNOSIDES AND DOCUSATE SODIUM 1 TABLET: 8.6; 5 TABLET ORAL at 19:47

## 2017-06-19 NOTE — PLAN OF CARE
Problem: Postpartum, Vaginal Delivery (Adult)  Goal: Signs and Symptoms of Listed Potential Problems Will be Absent or Manageable (Postpartum, Vaginal Delivery)  Signs and symptoms of listed potential problems will be absent or manageable by discharge/transition of care (reference Postpartum, Vaginal Delivery (Adult) CPG).   Outcome: Improving  On pathway. Voiding without difficulty. Breastfeeding baby with assistance and using a shield.  Good pain control.

## 2017-06-19 NOTE — PLAN OF CARE
Pt complaining of some discomfort, medicated with oral pain medications per orders. Pt breastfeeding infant well with a shield.  supportive at bedside, will continue to monitor.

## 2017-06-19 NOTE — PLAN OF CARE
Problem: Goal Outcome Summary  Goal: Goal Outcome Summary  Outcome: No Change  On pathway. Voiding without difficulty. Breastfeeding baby with assistance. Good pain control.

## 2017-06-19 NOTE — LACTATION NOTE
Initial Lactation visit. Hand out given. Recommend unlimited, frequent breast feedings: At least 8 - 12 times every 24 hours. Avoid pacifiers and supplementation with formula unless medically indicated. Explained benefits of holding baby skin on skin to help promote better breastfeeding outcomes. Pt had been given a shield.  Infant unable to latch well without shield, slips to only a very shallow latch.  Nipples are slightly flatter but do pull out once feeding with shield.   Latched and fed well during visit with shield.  Audible swallowing heard during feeding.   Discussed signs of a good latch and importance of ensuring baby stays latched well.  Pt encouraged to attempt to latch baby without shield but if it is pinching and not deep enough to use the shield for feeding.  Will revisit as needed.    Chrissy Watkins RN, IBCLC

## 2017-06-19 NOTE — PROGRESS NOTES
OB Post-partum Note  PPD# 1    S:  Patient feeling better, but  feeling very sore when ambulating  Voiding.  Bleeding scant.  Breast feeding fair with a nipple sheild    O:  /81  Temp 98.2  F (36.8  C) (Oral)  Resp 16  LMP 2016  SpO2 99%  Breastfeeding  Gen- A&O, NAD  Abd- Non-tender, fundus firm at umbilicus  Ext- non-tender, neg edema    Hemoglobin   Date Value Ref Range Status   2016 12.7 11.7 - 15.7 g/dL Final     A+  Rubella Immune    A/P: 32 year old  PPD# 1 s/p     1.  Routine post-partum cares.  2.  Anticipate d/c home on PPD# 2.    Itzel Torres CNM  2017  9:02 AM

## 2017-06-19 NOTE — PLAN OF CARE
Problem: Goal Outcome Summary  Goal: Goal Outcome Summary  Outcome: Improving  Vss, fundus firm with scant flow. Pain managed with tylenol, ibuprofen and oxycodone x 1. Breast feeding using nipple shield and her own soothies for sore nipples. Midwife notified of Hgb of 8.9. Pt denies lightheadedness or dizziness.

## 2017-06-19 NOTE — ANESTHESIA POSTPROCEDURE EVALUATION
Patient: Melissa Aguirre    * No procedures listed *    Diagnosis:* No pre-op diagnosis entered *  Diagnosis Additional Information: No value filed.    Anesthesia Type:  No value filed.    Note:  Anesthesia Post Evaluation    Patient location during evaluation: floor  Level of consciousness: awake and alert  Pain management: adequate  Airway patency: patent  Cardiovascular status: acceptable  Respiratory status: acceptable     Anesthetic complications: None    Comments: Patient unavailable during interview, but per notes, epidural has worn off and no complications.  If any concerns please contact anesthesia.          Last vitals:  Vitals:    06/18/17 0821 06/18/17 1214 06/18/17 1600   BP: 111/63 128/84 114/71   Resp: 16 16 16   Temp: 37  C (98.6  F) 36.8  C (98.2  F) 36.7  C (98.1  F)   SpO2: 99%           Electronically Signed By: Yoni Loaiza MD  June 18, 2017  7:55 PM

## 2017-06-20 VITALS
OXYGEN SATURATION: 99 % | TEMPERATURE: 97.7 F | RESPIRATION RATE: 16 BRPM | DIASTOLIC BLOOD PRESSURE: 78 MMHG | HEART RATE: 85 BPM | SYSTOLIC BLOOD PRESSURE: 119 MMHG

## 2017-06-20 PROCEDURE — 25000132 ZZH RX MED GY IP 250 OP 250 PS 637: Performed by: OBSTETRICS & GYNECOLOGY

## 2017-06-20 RX ADMIN — ACETAMINOPHEN 650 MG: 325 TABLET, FILM COATED ORAL at 09:05

## 2017-06-20 RX ADMIN — IBUPROFEN 800 MG: 400 TABLET ORAL at 09:05

## 2017-06-20 RX ADMIN — ACETAMINOPHEN 650 MG: 325 TABLET, FILM COATED ORAL at 02:55

## 2017-06-20 RX ADMIN — SENNOSIDES AND DOCUSATE SODIUM 1 TABLET: 8.6; 5 TABLET ORAL at 07:37

## 2017-06-20 RX ADMIN — IBUPROFEN 800 MG: 400 TABLET ORAL at 02:55

## 2017-06-20 NOTE — LACTATION NOTE
Follow up visit.  Infant feeding well.  Was able to get baby to feed without shield overnight some but R side is more difficult and baby tends to be too shallow so pt using shield on R.  Large amount of colostrum in shield during visit after baby was feeding.  Infant latched well.  Pt's nipples are tender but intact.  Discussed normal feeding patterns and process of milk coming in.  Reviewed breast care and how to deal with engorgement.  Outpatient lactation resources reviewed with pt for use upon discharge and encouraged pt to follow up later this week if continuing to use shield.  Pt states she has no further questions at this time.   Chrissy Watkins  RN, IBCLC

## 2017-06-20 NOTE — PROGRESS NOTES
[facility]   Post-Partum Progress Note        Interval History:   Doing well.  Pain is adequately controlled.  No fevers.  No history of foul-smelling vaginal discharge.  Good appetite.  Denies chest pain, shortness of breath, nausea or vomiting.  Vaginal bleeding is similar to a heavy menstrual flow.  Breastfeeding well.           Medications:   All medications related to the patient's surgery have been reviewed          Physical Exam:   All vitals stable  Blood pressure 120/78, pulse 89, temperature 98.2  F (36.8  C), temperature source Oral, resp. rate 16, last menstrual period 09/11/2016, SpO2 99   Uterine fundus is firm, non-tender and at the level of the umbilicus  Perineal sutures intact and wound healing well  LE non tender          Data:     Hemoglobin   Date Value Ref Range Status   06/19/2017 8.9 (L) 11.7 - 15.7 g/dL Final   11/30/2016 12.7 11.7 - 15.7 g/dL Final   09/28/2015 14.6 11.7 - 15.7 g/dL Final   10/07/2013 13.8 11.7 - 15.7 g/dL Final            Assessment and Plan:    Assessment:   Post-partum day #2  Vacuum extraction  Acute blood loss anemia. Asymptomatic. Pt's hgb at 37 weeks was 10.9 so not a large decrease  :     Doing well.  No excessive bleeding  Pain well-controlled.   Plan:   Ambulation encouraged  Pain control measures as needed  rec PNV and Fe OTC  Discharge later today  RTC in 6 weeks or sooner migueln     Carolynn Harrison

## 2017-06-20 NOTE — PLAN OF CARE
Problem: Goal Outcome Summary  Goal: Goal Outcome Summary  Outcome: Improving  Pt is looking forward to discharge to home.  Continues to monitor Pt.

## 2017-06-20 NOTE — PLAN OF CARE
Problem: Goal Outcome Summary  Goal: Goal Outcome Summary  Outcome: No Change  Fundus firm and bleeding wnl.  VSS.  Voiding without difficulty.  Taking tylenol and ibuprofen when due with good relief.  Independent with baby cares.  Encouraged to call with questions or concerns.  Will continue to monitor.

## 2017-06-20 NOTE — PLAN OF CARE
D: VSS, assessments WDL.   I: Pt. received complete discharge paperwork and no home medications as filled by discharge pharmacy.  Pt. was given times of last dose for all discharge medications in writing on discharge medication sheets.  Discharge teaching included home medication, pain management, activity restrictions, postpartum cares, and signs and symptoms of infection.    A: Discharge outcomes on care plan met.  Mother states understanding and comfort with self and baby cares.  P: Pt. discharged to home.  Pt. was discharged with baby, and bands were checked at time of discharge.  Pt. was accompanied by , nurse and baby, and left with personal belongings.  Pt. to follow up with OB per MD order.  Pt. had no further questions at the time of discharge and no unmet needs were identified.

## 2017-08-23 ENCOUNTER — MYC MEDICAL ADVICE (OUTPATIENT)
Dept: FAMILY MEDICINE | Facility: CLINIC | Age: 32
End: 2017-08-23

## 2017-08-23 DIAGNOSIS — M54.5 LOW BACK PAIN, UNSPECIFIED BACK PAIN LATERALITY, UNSPECIFIED CHRONICITY, WITH SCIATICA PRESENCE UNSPECIFIED: Primary | ICD-10-CM

## 2018-07-14 ENCOUNTER — HOSPITAL LABORATORY (OUTPATIENT)
Dept: OTHER | Facility: CLINIC | Age: 33
End: 2018-07-14

## 2018-07-14 LAB — B-HCG SERPL-ACNC: 1055 IU/L (ref 0–5)

## 2018-08-24 LAB
ABO + RH BLD: NORMAL
ABO + RH BLD: NORMAL
BLD GP AB SCN SERPL QL: NEGATIVE
HBV SURFACE AG SERPL QL IA: NEGATIVE
HCT VFR BLD AUTO: 37.7 %
HEMOGLOBIN: 12.2 G/DL (ref 11.7–15.7)
HIV 1+2 AB+HIV1 P24 AG SERPL QL IA: NEGATIVE
PLATELET # BLD AUTO: NORMAL 10^9/L
RUBELLA ANTIBODY IGG QUANTITATIVE: NORMAL IU/ML
TREPONEMA ANTIBODIES: NORMAL

## 2018-11-02 ENCOUNTER — MEDICAL CORRESPONDENCE (OUTPATIENT)
Dept: HEALTH INFORMATION MANAGEMENT | Facility: CLINIC | Age: 33
End: 2018-11-02

## 2018-11-02 ENCOUNTER — TRANSFERRED RECORDS (OUTPATIENT)
Dept: HEALTH INFORMATION MANAGEMENT | Facility: CLINIC | Age: 33
End: 2018-11-02

## 2018-11-05 ENCOUNTER — PRE VISIT (OUTPATIENT)
Dept: MATERNAL FETAL MEDICINE | Facility: CLINIC | Age: 33
End: 2018-11-05

## 2018-11-08 ENCOUNTER — OFFICE VISIT (OUTPATIENT)
Dept: MATERNAL FETAL MEDICINE | Facility: CLINIC | Age: 33
End: 2018-11-08
Attending: OBSTETRICS & GYNECOLOGY
Payer: COMMERCIAL

## 2018-11-08 ENCOUNTER — HOSPITAL ENCOUNTER (OUTPATIENT)
Dept: ULTRASOUND IMAGING | Facility: CLINIC | Age: 33
Discharge: HOME | End: 2018-11-08
Attending: OBSTETRICS & GYNECOLOGY | Admitting: OBSTETRICS & GYNECOLOGY
Payer: COMMERCIAL

## 2018-11-08 ENCOUNTER — HOSPITAL ENCOUNTER (OUTPATIENT)
Dept: LAB | Facility: CLINIC | Age: 33
End: 2018-11-08
Attending: OBSTETRICS & GYNECOLOGY
Payer: COMMERCIAL

## 2018-11-08 DIAGNOSIS — O35.BXX1 FETAL VENTRICULAR SEPTAL DEFECT AFFECTING ANTEPARTUM CARE OF MOTHER, FETUS 1: Primary | ICD-10-CM

## 2018-11-08 DIAGNOSIS — O28.3 ABNORMAL PRENATAL ULTRASOUND: Primary | ICD-10-CM

## 2018-11-08 DIAGNOSIS — O26.90 PREGNANCY RELATED CONDITION, ANTEPARTUM: ICD-10-CM

## 2018-11-08 DIAGNOSIS — O28.3 ABNORMAL PRENATAL ULTRASOUND: ICD-10-CM

## 2018-11-08 DIAGNOSIS — O35.9XX0 SUSPECTED FETAL ANOMALY, ANTEPARTUM, SINGLE OR UNSPECIFIED FETUS: ICD-10-CM

## 2018-11-08 PROCEDURE — 40000072 ZZH STATISTIC GENETIC COUNSELING, < 16 MIN: Mod: ZF | Performed by: GENETIC COUNSELOR, MS

## 2018-11-08 PROCEDURE — 76811 OB US DETAILED SNGL FETUS: CPT

## 2018-11-08 PROCEDURE — 36415 COLL VENOUS BLD VENIPUNCTURE: CPT | Performed by: OBSTETRICS & GYNECOLOGY

## 2018-11-08 PROCEDURE — 40000791 ZZHCL STATISTIC VERIFI PRENATAL TRISOMY 21,18,13: Performed by: OBSTETRICS & GYNECOLOGY

## 2018-11-08 NOTE — MR AVS SNAPSHOT
After Visit Summary   11/8/2018    Melissa Aguirre    MRN: [id number]           Patient Information     Date Of Birth          1985        Visit Information        Provider Department      11/8/2018 2:45 PM Kira Quezada MD [facility] Maternal Fetal Medicine [facility]        Today's Diagnoses     Fetal ventricular septal defect affecting antepartum care of mother, fetus 1    -  1    Suspected fetal anomaly, antepartum, single or unspecified fetus           Follow-ups after your visit        Your next 10 appointments already scheduled     Nov 09, 2018  2:00 PM CST   Ech Fetal Complete* with URFETR1   [facility] Echo/EKG ([facility])    [address]  [facility] [place] [address]                 Future tests that were ordered for you today     Open Future Orders        Priority Expected Expires Ordered    Non Invasive Prenatal Test Cell Free DNA Routine  2/6/2019 11/8/2018    Echo Fetal Complete-Peds Cardiology Routine  11/8/2019 11/8/2018            Who to contact     If you have questions or need follow up information about today's clinic visit or your schedule please contact [facility] directly at [phone].  Normal or non-critical lab and imaging results will be communicated to you by InfoHubblehart, letter or phone within 4 business days after the clinic has received the results. If you do not hear from us within 7 days, please contact the clinic through SimplyInsuredt or phone. If you have a critical or abnormal lab result, we will notify you by phone as soon as possible.  Submit refill requests through Mikro Odeme | 3pay or call your pharmacy and they will forward the refill request to us. Please allow 3 business days for your refill to be completed.          Additional Information About Your Visit        InfoHubblehart Information     [employer] | 3pay gives you secure access to your electronic health record. If you see a primary care provider, you can also send messages  to your care team and make appointments. If you have questions, please call your primary care clinic.  If you do not have a primary care provider, please call [phone] and they will assist you.        Care EveryWhere ID     This is your Care EveryWhere ID. This could be used by other organizations to access your [place] medical records  [id number]        Your Vitals Were     Last Period                   06/05/2018            Blood Pressure from Last 3 Encounters:   06/20/17 119/78   06/11/17 129/75   03/17/17 110/74    Weight from Last 3 Encounters:   06/11/17 88.5 kg (195 lb)   03/17/17 79.6 kg (175 lb 7 oz)   11/22/16 70.1 kg (154 lb 9.6 oz)               Primary Care Provider Office Phone # Fax #    Taylor Amato [phone] [address] [facility] [address]        Equal Access to Services     ALIREZA DUGGAN : Hadii sydni molina hadasho Sojena, waaxda luqadaha, qaybta kaalmada adeegyada, lucho marsh . So [facility] [phone].    ATENCIÓN: Si habla español, tiene a tobin disposición servicios gratuitos de asistencia lingüística. Mary Beth al [phone].    We comply with applicable federal civil rights laws and [place] laws. We do not discriminate on the basis of race, color, national origin, age, disability, sex, sexual orientation, or gender identity.            Thank you!     Thank you for choosing [facility]  for your care. Our goal is always to provide you with excellent care. Hearing back from our patients is one way we can continue to improve our services. Please take a few minutes to complete the written survey that you may receive in the mail after your visit with us. Thank you!             Your Updated Medication List - Protect others around you: Learn how to safely use, store and throw away your medicines at www.disposemymeds.org.          This list is accurate as of 11/8/18 10:17 PM.  Always use your most recent  med list.                   Brand Name Dispense Instructions for use Diagnosis    albuterol 108 (90 Base) MCG/ACT inhaler    PROAIR HFA/PROVENTIL HFA/VENTOLIN HFA    1 Inhaler    Inhale 2 puffs into the lungs 3 times daily as needed for shortness of breath / dyspnea or wheezing    Acute bronchospasm       prenatal multivitamin plus iron 27-0.8 MG Tabs per tablet     100 tablet    Take 1 tablet by mouth daily

## 2018-11-08 NOTE — MR AVS SNAPSHOT
After Visit Summary   11/8/2018    Melissa Aguirre    MRN: [id number]           Patient Information     Date Of Birth          1985        Visit Information        Provider Department      11/8/2018 1:30 PM [facility] Maternal Fetal Medicine [facility]        Today's Diagnoses     Abnormal prenatal ultrasound    -  1       Follow-ups after your visit        Your next 10 appointments already scheduled     Nov 09, 2018  2:00 PM CST   Ech Fetal Complete* with URFETR1   [facility] Echo/EKG ([facility])    [address] [facility] [address]                 Future tests that were ordered for you today     Open Future Orders        Priority Expected Expires Ordered    Non Invasive Prenatal Test Cell Free DNA Routine  2/6/2019 11/8/2018    Echo Fetal Complete-Peds Cardiology Routine  11/8/2019 11/8/2018            Who to contact     If you have questions or need follow up information about today's clinic visit or your schedule please contact [facility] directly at [phone].  Normal or non-critical lab and imaging results will be communicated to you by Animal Cell Therapieshart, letter or phone within 4 business days after the clinic has received the results. If you do not hear from us within 7 days, please contact the clinic through [email or web address] or phone. If you have a critical or abnormal lab result, we will notify you by phone as soon as possible.  Submit refill requests through [email or web address] or call your pharmacy and they will forward the refill request to us. Please allow 3 business days for your refill to be completed.          Additional Information About Your Visit        Animal Cell TherapiesharSkyRecon Systems Information     [email or web address] gives you secure access to your electronic health record. If you see a primary care provider, you can also send messages to your care team and make appointments. If you have questions, please call your primary care clinic.  If you do not have a  primary care provider, please call [phone] and they will assist you.        Care EveryWhere ID     This is your Care EveryWhere ID. This could be used by other organizations to access your [place] medical records  [id number]        Your Vitals Were     Last Period                   06/05/2018            Blood Pressure from Last 3 Encounters:   06/20/17 119/78   06/11/17 129/75   03/17/17 110/74    Weight from Last 3 Encounters:   06/11/17 88.5 kg (195 lb)   03/17/17 79.6 kg (175 lb 7 oz)   11/22/16 70.1 kg (154 lb 9.6 oz)              We Performed the Following     [facility]        Primary Care Provider Office Phone # Fax #    Taylor Amato [phone] [address] [facility] [address]        Equal Access to Services     ALIREZA DUGGAN : Hadii sydni molina hadasho Sojena, waaxda luqadaha, qaybta kaalmada yuliya, lucho marsh . So [facility] [phone].    ATENCIÓN: Si habla español, tiene a tobin disposición servicios gratuitos de asistencia lingüística. Alvarezame al [phone].    We comply with applicable federal civil rights laws and [place] laws. We do not discriminate on the basis of race, color, national origin, age, disability, sex, sexual orientation, or gender identity.            Thank you!     Thank you for choosing [facility]  for your care. Our goal is always to provide you with excellent care. Hearing back from our patients is one way we can continue to improve our services. Please take a few minutes to complete the written survey that you may receive in the mail after your visit with us. Thank you!             Your Updated Medication List - Protect others around you: Learn how to safely use, store and throw away your medicines at www.disposemymeds.org.          This list is accurate as of 11/8/18 11:59 PM.  Always use your most recent med list.                   Brand Name Dispense Instructions for  use Diagnosis    albuterol 108 (90 Base) MCG/ACT inhaler    PROAIR HFA/PROVENTIL HFA/VENTOLIN HFA    1 Inhaler    Inhale 2 puffs into the lungs 3 times daily as needed for shortness of breath / dyspnea or wheezing    Acute bronchospasm       prenatal multivitamin plus iron 27-0.8 MG Tabs per tablet     100 tablet    Take 1 tablet by mouth daily

## 2018-11-09 ENCOUNTER — HOSPITAL ENCOUNTER (OUTPATIENT)
Dept: CARDIOLOGY | Facility: CLINIC | Age: 33
Discharge: HOME | End: 2018-11-09
Attending: OBSTETRICS & GYNECOLOGY | Admitting: OBSTETRICS & GYNECOLOGY
Payer: COMMERCIAL

## 2018-11-09 DIAGNOSIS — O35.9XX0 SUSPECTED FETAL ANOMALY, ANTEPARTUM, SINGLE OR UNSPECIFIED FETUS: ICD-10-CM

## 2018-11-09 PROCEDURE — 76827 ECHO EXAM OF FETAL HEART: CPT

## 2018-11-09 NOTE — CONSULTS
[facility]   Heart Center Fetal Consult Note    Patient:  Melissa Aguirre MRN:  [id number]   YOB: 1985 Age:  33 year old   Date of Visit:  2018 PCP:  Taylor Amato MD     Dear Dr. Quezada:    I had the pleasure of seeing Melissa Aguirre at the [facility] on 2018 in fetal cardiology consultation today. She presented today accompanied by her . As you know, she is a 33 year old  at 21w4d who presented for fetal echocardiogram today because of possible fetal VSD.    I performed and interpreted the fetal echocardiogram today, which demonstrated normal fetal cardiac anatomy, ventricular size and systolic function as well as normal heart rate and rhythm.    The  findings on today's exam were discussed. The routine limitations of fetal echocardiography were also reviewed, namely the inability to rule out small defects of the atrial or ventricular septum, coarctation of the aorta, minor valve abnormalities, or partial anomalous pulmonary venous return.     No additional fetal echocardiograms required, unless new concerns arise.    I appreciate the opportunity of participating in Melissa Aguirre 's care.    This visit was separate from the performance and interpretation of the ultrasound. The majority of the time (>50%) was spent in counseling and coordination of care. I spent  20 minutes in face-to-face time reviewing the above considerations.    Claudia Holloway MD  [facility]  Pediatric Cardiology  [address] [place] [address], 5th floor, [facility] [address]  Phone # [phone]  Pager # [phone]  Fax [phone]

## 2018-11-09 NOTE — PROGRESS NOTES
Please see ultrasound report under imaging tab for details on ultrasound performed today.    Kira Quezada MD  , OB/GYN  Maternal-Fetal Medicine  [email or web address]  [phone] (Academic office)  [phone] (Pager)

## 2018-11-09 NOTE — PROGRESS NOTES
I met with Melissa today at the request of Dr. Quezada due to the possible ventricular septal defect seen on her comprehensive ultrasound today. We discussed non-invasive prenatal screening and amniocentesis. Melissa currently declines invasive testing and would like to proceed with non-invasive prenatal screening via Innatal with [employer]. She will be informed of these results in approximately 7-10 days.      Time spent: 15 minutes     Nori Jose MS, [facility]  Maternal Fetal Medicine  [facility]  Ph: [phone]

## 2018-11-15 ENCOUNTER — TELEPHONE (OUTPATIENT)
Dept: MATERNAL FETAL MEDICINE | Facility: CLINIC | Age: 33
End: 2018-11-15

## 2018-11-15 LAB — LAB SCANNED RESULT: NORMAL

## 2018-11-15 NOTE — TELEPHONE ENCOUNTER
"11/15/2018    I left a message for Melissa regarding her normal \"Innatal\" cell-free fetal DNA screening results.  Results came back negative for chromosome abnormalities in chromosomes 21, 18, & 13.  These normal results suggest the likelihood of fetal Down syndrome, trisomy 13, or trisomy 18 is very low. (See scanned report under lab tab in epic). Sex chromosomes were not evaluated (patient opted out).    Discussed high detection rates for fetal Down syndrome, trisomies 13 and 18; however, not 100%. While this test is a highly accurate screen, it does not replace the diagnostic capabilities of standard prenatal diagnostic tests such as amniocentesis and [facility].     Nori Jose MS, [facility]  Licensed Genetic Counselor  Phone: [phone]    "

## 2018-12-31 ENCOUNTER — MYC MEDICAL ADVICE (OUTPATIENT)
Dept: FAMILY MEDICINE | Facility: CLINIC | Age: 33
End: 2018-12-31

## 2019-01-03 ENCOUNTER — OFFICE VISIT (OUTPATIENT)
Dept: FAMILY MEDICINE | Facility: CLINIC | Age: 34
End: 2019-01-03
Payer: COMMERCIAL

## 2019-01-03 VITALS
HEART RATE: 91 BPM | BODY MASS INDEX: 28.64 KG/M2 | TEMPERATURE: 97.8 F | RESPIRATION RATE: 16 BRPM | OXYGEN SATURATION: 99 % | DIASTOLIC BLOOD PRESSURE: 84 MMHG | SYSTOLIC BLOOD PRESSURE: 128 MMHG | HEIGHT: 68 IN | WEIGHT: 189 LBS

## 2019-01-03 DIAGNOSIS — M54.41 ACUTE BILATERAL LOW BACK PAIN WITH RIGHT-SIDED SCIATICA: Primary | ICD-10-CM

## 2019-01-03 PROCEDURE — 99214 OFFICE O/P EST MOD 30 MIN: CPT | Performed by: FAMILY MEDICINE

## 2019-01-03 ASSESSMENT — MIFFLIN-ST. JEOR: SCORE: 1615.8

## 2019-01-03 NOTE — PROGRESS NOTES
SUBJECTIVE:   Melissa Aguirre is a 33 year old female who presents to clinic today for the following health issues:      Joint Pain    Onset: a couple of weeks ago     Description:   Location: right and left leg   Character: burning sensation     Intensity: moderate    Progression of Symptoms: same, intermittent    Accompanying Signs & Symptoms:  Other symptoms: none    History:   Previous similar pain: no       Precipitating factors:   Trauma or overuse: no     Alleviating factors:  Improved by: nothing    Therapies Tried and outcome: Pt was given a referral thru her OBGYN but insurance wants one from primary provider  32 yo female ,Expected date of delivery  19,Routine care at [facility]  Uneventful pregnancy until 2 weeks ago , here to discuss lower back pain since 2 weeks  bilateral back pain- shooting down to right hip intermittent varies mild to moderate   Rest & heat help a lot  Worse with being on feet or walking  Yesterday was very busy- it did get worse, needed support to walk, rest overnight and much better today  Had PHYSICAL THERAPY post partum with last delivery- 1.5 yrs ago  Has taken occasional doses of tylenol  Bowel / bladder is intact. No saddle numbness, no fall, paralysis or injury     PROBLEMS TO ADD ON...    Problem list and histories reviewed & adjusted, as indicated.  Additional history: as documented    Patient Active Problem List   Diagnosis     LDL goal <160     Tinea versicolor     Mid back pain on left side     Onychomycosis     Family planning counseling     Late period     RLQ abdominal pain     Pregnancy test positive     Indication for care in labor or delivery     Vacuum extractor delivery, delivered     Past Surgical History:   Procedure Laterality Date     NO HISTORY OF SURGERY         Social History     Tobacco Use     Smoking status: Never Smoker     Smokeless tobacco: Never Used   Substance Use Topics     Alcohol use: No     Alcohol/week: 0.0 oz     Comment: none  "during pregnancy     Family History   Problem Relation Age of Onset     Depression Mother      Eye Disorder Mother      Gynecology Mother         endometriosis     Hypertension Father      Eye Disorder Father      Lipids Father      Cardiovascular Maternal Grandmother      Eye Disorder Maternal Grandmother      Thyroid Disease Maternal Grandmother      Diabetes Maternal Grandfather      Hypertension Maternal Grandfather      Eye Disorder Other         self     Genitourinary Problems Other         self-     Gynecology Other         self-endometriosis     Musculoskeletal Disorder Other         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    OBJECTIVE:     /82   Pulse 91   Temp 97.8  F (36.6  C) (Oral)   Resp 16   Ht 1.727 m (5' 8\")   Wt 85.7 kg (189 lb)   LMP 06/05/2018   SpO2 99%   BMI 28.74 kg/m    Body mass index is 28.74 kg/m .  GENERAL: healthy, alert and no distress  RESP: lungs clear to auscultation - no rales, rhonchi or wheezes  CV: regular rate and rhythm, normal S1 S2, no S3 or S4, no murmur, click or rub, no peripheral edema and peripheral pulses strong  ABDOMEN: gravid   MS: extremities normal- no gross deformities noted  CNS; Neuro: Cranial nerves and fundi are normal. KAYLAH. EOM's intact. No papilledema. Neck supple. No bruits. Normal deep tendon reflexes.      ASSESSMENT/PLAN:   1. Acute bilateral low back pain with right-sided sciatica  Plan:symtommatic treatment with posture, OTC acetaminophen, & start PHYSICAL THERAPY  - PHYSICAL THERAPY REFERRAL; Future  encouraged PHYSICAL THERAPY-whichever place is covered by the insurance  Warning signs of cord compression;saddle numbness , loss of bowel or bladder control  If any seek emergency department or UC- " otherwise follow up in 4 weeks  NO NSAID   OK to take over the counter acetaminophen as needed       34 yo female ,Expected date of delivery  19,  Routine care at [facility]  We discussed Blood pressure is slightly elevated  Continue prenatal care with OB   Ynes Cardenas MD  [facility]

## 2019-01-03 NOTE — NURSING NOTE
"Chief Complaint   Patient presents with     Referral     Musculoskeletal Problem     /82   Pulse 91   Temp 97.8  F (36.6  C) (Oral)   Resp 16   Ht 1.727 m (5' 8\")   Wt 85.7 kg (189 lb)   LMP 06/05/2018   SpO2 99%   BMI 28.74 kg/m   Estimated body mass index is 28.74 kg/m  as calculated from the following:    Height as of this encounter: 1.727 m (5' 8\").    Weight as of this encounter: 85.7 kg (189 lb).  bp completed using cuff size: regular       Health Maintenance addressed:  NONE    n/a    Virginia Mensah MA     "

## 2019-01-03 NOTE — PATIENT INSTRUCTIONS
PHYSICAL THERAPY [facility], [phone]    Or at [facility]- whichever provide zepeda with the best service and covered by insurance  Patient Education     Back Pain During Pregnancy: Moving Safely  Learning the proper ways to bend, lift, and carry objects may help relieve back strain. It will also help you protect your back after your baby is born. Remember, if you re having trouble protecting your back, it s OK to ask the people around you for help!       Bending Lifting Carrying   Bending  To protect your back as you bend:    Put 1 foot slightly in front of the other. Bend at the knees and hips, pushing your hips backward. Keep your upper body as straight as you can.    Face forward. Try to keep your ears, shoulders, and hips in a line.    Don t hold your breath.  Lifting  To lift a large object or a child:    Get as close to the load as you can. Face forward, to help keep your ears and shoulders aligned.    Use the muscles in your thighs and buttocks to stand up. As you lift, tighten your stomach and pelvic floor muscles.    Don t hold your breath. Avoid twisting.  Carrying  To carry a load safely:    Carry an object or child in front of you, not resting on your hip.    Break up your load into 2 smaller bags, if you can. Carry 1 bag on each side to maintain balance. Or, break the load into smaller ones and take more trips.    Try to tighten your stomach and pelvic floor muscles as you walk. This helps take weight off your back.  Date Last Reviewed: 2/1/2018 2000-2018 [employer] [address] [place] [address], [place] [address]. All rights reserved. This information is not intended as a substitute for professional medical care. Always follow your healthcare professional's instructions.           Patient Education     Back Pain During Pregnancy: Positioning Yourself     When standing, resting a foot on a low block can ease back pain.     You likely position yourself differently now than  you did before you were pregnant. Did you know that standing, sitting, or lying in certain ways can lead to back pain? To ease pain, use positions that support your body comfortably.   Tips for good posture  Using good posture means holding yourself so that your spine is aligned and your muscles can work without strain. To use good posture:    Raise your chest and head. Try to keep your ears lined up over your shoulders.    Use your stomach muscles to pull in your stomach. This reduces the amount of weight your back must support.    Keep your pelvis level. Think of your pelvis as a bowl of water that will spill if it tips too far forward or backward.  Standing  If you must stand for long periods, try to change positions every 15 minutes. This gives your muscles a break. When standing, also:    Keep your legs slightly apart. This helps you balance your weight.    Rest one foot on a book, ledge, or low stool. Every few minutes, switch legs.    Wear comfortable shoes with padded soles and arch support, like athletic shoes.  Sitting  When sitting in a chair or car, make sure your spine s lumbar curve is supported. Use a chair with lumbar support built in, or put a firm pillow against your lower back. Also try the following:    Sit with your knees slightly lower than your hips. Don t cross your legs.    Take deep breaths often. This helps keep your spine and stomach in the best position.    Vary your activity each hour. For instance, get up from your desk and take a 5-minute walk around the office.  Lying down  To lie safely and comfortably:    Lie on your side with your knees slightly bent. This takes pressure off your uterus and improves blood flow to your baby.    Place pillows under your abdomen and between your knees.    To get out of bed, roll onto your side. Use your arms to push yourself into a seated position. Scoot to the edge of the bed and place your feet on the floor. Lean forward, then use your leg muscles  to stand.    Consider investing in a firm mattress.  Lifting  Tip to safely lift:     Do not bend over from the waist to pick things up-squat down, bend your knees, and keep your back straight.  Date Last Reviewed: 2/1/2018 2000-2018 [employer]. [address] [place] [address]. All rights reserved. This information is not intended as a substitute for professional medical care. Always follow your healthcare professional's instructions.           Patient Education     Possible Causes of Low Back or Leg Pain    The symptoms in your back or leg may be due to pressure on a nerve. This pressure may be caused by a damaged disk or by abnormal bone growth. Either way, you may feel pain, burning, tingling, or numbness. If you have pressure on a nerve that connects to the sciatic nerve, pain may shoot down your leg.    Pressure from the disk  Constant wear and tear can weaken a disk over time and cause back pain. The disk can then be damaged by a sudden movement or injury. If its soft center starts to bulge, the disk may press on a nerve. Or the outside of the disk may tear, and the soft center may squeeze through and pinch a nerve.    Pressure from bone  As a disk wears out, the vertebrae right above and below the disk start to touch. This can put pressure on a nerve. Often, abnormal bone (called bone spurs) grows where the vertebrae rub against each other. This can cause the foramen or the spinal canal to narrow (called stenosis) and press against a nerve.  Date Last Reviewed: 3/1/2018    7927-2990 [employer]. [address] [place] [address]. All rights reserved. This information is not intended as a substitute for professional medical care. Always follow your healthcare professional's instructions.           Patient Education     Relieving Back Pain During Pregnancy: Wall Stretch, Body Bend  Before trying these exercises, talk to your healthcare provider to make sure they  are safe for you. Ask your healthcare provider how many times to do each exercise.      Wall stretch Body bend   Wall stretch  This strengthens and loosens the muscles in your upper back:  1. Lean against a wall with a firm pillow or rolled towel under your shoulder blades. Your feet should be about 12 inches from the wall and shoulder-width apart. Point your chin down.  2. Breathe in. Push your shoulders, neck, and head against the wall. You will feel a stretch in your shoulders.  3. Hold for 5 counts. Then breathe out, and relax your shoulders and neck.  Body bend  This strengthens your back and buttocks muscles:  1. Stand with your legs shoulder-width apart. Put your hands on your upper thighs and bend your knees slightly.  2. Slowly bend forward at the hips. Push your hips back and keep your shoulders up. Make sure your back is straight. You ll feel a stretch in your upper thighs. You ll also feel your back muscles holding you in position.  3. Hold for 5 counts, then straighten.  Date Last Reviewed: 2/1/2018 2000-2018 [employer]. [address] [place] [address]. All rights reserved. This information is not intended as a substitute for professional medical care. Always follow your healthcare professional's instructions.           Patient Education     Back Pain During Pregnancy: Positioning Yourself     When standing, resting a foot on a low block can ease back pain.     You likely position yourself differently now than you did before you were pregnant. Did you know that standing, sitting, or lying in certain ways can lead to back pain? To ease pain, use positions that support your body comfortably.   Tips for good posture  Using good posture means holding yourself so that your spine is aligned and your muscles can work without strain. To use good posture:    Raise your chest and head. Try to keep your ears lined up over your shoulders.    Use your stomach muscles to pull in your  stomach. This reduces the amount of weight your back must support.    Keep your pelvis level. Think of your pelvis as a bowl of water that will spill if it tips too far forward or backward.  Standing  If you must stand for long periods, try to change positions every 15 minutes. This gives your muscles a break. When standing, also:    Keep your legs slightly apart. This helps you balance your weight.    Rest one foot on a book, ledge, or low stool. Every few minutes, switch legs.    Wear comfortable shoes with padded soles and arch support, like athletic shoes.  Sitting  When sitting in a chair or car, make sure your spine s lumbar curve is supported. Use a chair with lumbar support built in, or put a firm pillow against your lower back. Also try the following:    Sit with your knees slightly lower than your hips. Don t cross your legs.    Take deep breaths often. This helps keep your spine and stomach in the best position.    Vary your activity each hour. For instance, get up from your desk and take a 5-minute walk around the office.  Lying down  To lie safely and comfortably:    Lie on your side with your knees slightly bent. This takes pressure off your uterus and improves blood flow to your baby.    Place pillows under your abdomen and between your knees.    To get out of bed, roll onto your side. Use your arms to push yourself into a seated position. Scoot to the edge of the bed and place your feet on the floor. Lean forward, then use your leg muscles to stand.    Consider investing in a firm mattress.  Lifting  Tip to safely lift:     Do not bend over from the waist to pick things up-squat down, bend your knees, and keep your back straight.  Date Last Reviewed: 2/1/2018 2000-2018 [employer]. [address] [place] [address], [place] [address]. All rights reserved. This information is not intended as a substitute for professional medical care. Always follow your healthcare professional's  instructions.           Patient Education     Caring for Your Back Throughout the Day  Take care of your back throughout the day. You will likely have fewer back problems if you do. Try to warm up before you move. Shift positions often. Also do your best to form healthy habits.    Warm up for the day  Do a few slow, catlike stretches before starting your day. This simple warmup can soften your disks, stretch your back muscles, and help prevent injuries.  Shift positions often  At work and at home, change positions often. This helps keep your body from getting stiff. Stand up or lean back while you sit. If you can, get up and move every 1/2 hour.  Form healthy habits  Here are some suggestions:     Keep a healthy weight. When you weigh too much, your back is under excess strain. But losing just a few extra pounds can help a lot.    Try not to overeat. Learn about serving sizes. The size of a serving depends on the food and the food group. Many foods list serving sizes on the labels.    Handle minor aches with cold and heat. Apply cold the first 24 to 48 hours. Use heat after that. Always place a thin cloth between your skin and the source of cold or heat.    Take medicines as directed. This helps keep pain under control. Always read labels, and call your healthcare provider or pharmacist if you have any questions.  Walk each day  A daily walk keeps your back and thigh muscles stretched and strong. This gives your back better support. Be sure to walk with your spine s three curves aligned, by keeping your head, hips, and toes connected by a vertical line.   Date Last Reviewed: 5/1/2018 2000-2018 [employer]. [address] [place] [address], [place] [address]. All rights reserved. This information is not intended as a substitute for professional medical care. Always follow your healthcare professional's instructions.

## 2019-02-20 LAB — GROUP B STREP PCR: NEGATIVE

## 2019-03-18 RX ORDER — LIDOCAINE 40 MG/G
CREAM TOPICAL
Status: CANCELLED | OUTPATIENT
Start: 2019-03-18

## 2019-03-18 RX ORDER — SODIUM CHLORIDE, SODIUM LACTATE, POTASSIUM CHLORIDE, CALCIUM CHLORIDE 600; 310; 30; 20 MG/100ML; MG/100ML; MG/100ML; MG/100ML
INJECTION, SOLUTION INTRAVENOUS CONTINUOUS
Status: CANCELLED | OUTPATIENT
Start: 2019-03-18

## 2019-03-18 RX ORDER — TERBUTALINE SULFATE 1 MG/ML
0.25 INJECTION, SOLUTION SUBCUTANEOUS
Status: CANCELLED | OUTPATIENT
Start: 2019-03-18

## 2019-03-18 RX ORDER — OXYTOCIN/0.9 % SODIUM CHLORIDE 30/500 ML
1-24 PLASTIC BAG, INJECTION (ML) INTRAVENOUS CONTINUOUS
Status: CANCELLED | OUTPATIENT
Start: 2019-03-18

## 2019-03-20 ENCOUNTER — HOSPITAL ENCOUNTER (INPATIENT)
Facility: CLINIC | Age: 34
LOS: 1 days | Discharge: HOME HEALTH | End: 2019-03-21
Attending: OBSTETRICS & GYNECOLOGY | Admitting: OBSTETRICS & GYNECOLOGY
Payer: COMMERCIAL

## 2019-03-20 ENCOUNTER — ANESTHESIA EVENT (OUTPATIENT)
Dept: OBGYN | Facility: CLINIC | Age: 34
End: 2019-03-20
Payer: COMMERCIAL

## 2019-03-20 ENCOUNTER — ANESTHESIA (OUTPATIENT)
Dept: OBGYN | Facility: CLINIC | Age: 34
End: 2019-03-20
Payer: COMMERCIAL

## 2019-03-20 LAB
ABO + RH BLD: NORMAL
ABO + RH BLD: NORMAL
RUPTURE OF FETAL MEMBRANES BY ROM PLUS: POSITIVE
SPECIMEN EXP DATE BLD: NORMAL
T PALLIDUM AB SER QL: NONREACTIVE

## 2019-03-20 PROCEDURE — 59025 FETAL NON-STRESS TEST: CPT | Mod: 59

## 2019-03-20 PROCEDURE — 25000125 ZZHC RX 250: Performed by: ANESTHESIOLOGY

## 2019-03-20 PROCEDURE — 25000128 H RX IP 250 OP 636: Performed by: ANESTHESIOLOGY

## 2019-03-20 PROCEDURE — 72200001 ZZH LABOR CARE VAGINAL DELIVERY SINGLE

## 2019-03-20 PROCEDURE — 3E0R3BZ INTRODUCTION OF ANESTHETIC AGENT INTO SPINAL CANAL, PERCUTANEOUS APPROACH: ICD-10-PCS | Performed by: ANESTHESIOLOGY

## 2019-03-20 PROCEDURE — 86901 BLOOD TYPING SEROLOGIC RH(D): CPT | Performed by: OBSTETRICS & GYNECOLOGY

## 2019-03-20 PROCEDURE — 00HU33Z INSERTION OF INFUSION DEVICE INTO SPINAL CANAL, PERCUTANEOUS APPROACH: ICD-10-PCS | Performed by: ANESTHESIOLOGY

## 2019-03-20 PROCEDURE — 27110038 ZZH RX 271: Performed by: ANESTHESIOLOGY

## 2019-03-20 PROCEDURE — 25800030 ZZH RX IP 258 OP 636: Performed by: OBSTETRICS & GYNECOLOGY

## 2019-03-20 PROCEDURE — 59025 FETAL NON-STRESS TEST: CPT

## 2019-03-20 PROCEDURE — G0463 HOSPITAL OUTPT CLINIC VISIT: HCPCS

## 2019-03-20 PROCEDURE — 25000125 ZZHC RX 250: Performed by: OBSTETRICS & GYNECOLOGY

## 2019-03-20 PROCEDURE — 0064U ANTB TP TOTAL&RPR IA QUAL: CPT | Performed by: OBSTETRICS & GYNECOLOGY

## 2019-03-20 PROCEDURE — 12000035 ZZH R&B POSTPARTUM

## 2019-03-20 PROCEDURE — 37000011 ZZH ANESTHESIA WARD SERVICE

## 2019-03-20 PROCEDURE — 84112 EVAL AMNIOTIC FLUID PROTEIN: CPT | Performed by: OBSTETRICS & GYNECOLOGY

## 2019-03-20 PROCEDURE — 25000132 ZZH RX MED GY IP 250 OP 250 PS 637: Performed by: OBSTETRICS & GYNECOLOGY

## 2019-03-20 PROCEDURE — 36415 COLL VENOUS BLD VENIPUNCTURE: CPT | Performed by: OBSTETRICS & GYNECOLOGY

## 2019-03-20 PROCEDURE — 86900 BLOOD TYPING SEROLOGIC ABO: CPT | Performed by: OBSTETRICS & GYNECOLOGY

## 2019-03-20 RX ORDER — OXYTOCIN 10 [USP'U]/ML
10 INJECTION, SOLUTION INTRAMUSCULAR; INTRAVENOUS
Status: DISCONTINUED | OUTPATIENT
Start: 2019-03-20 | End: 2019-03-21 | Stop reason: HOSPADM

## 2019-03-20 RX ORDER — AMOXICILLIN 250 MG
2 CAPSULE ORAL 2 TIMES DAILY
Status: DISCONTINUED | OUTPATIENT
Start: 2019-03-20 | End: 2019-03-21 | Stop reason: HOSPADM

## 2019-03-20 RX ORDER — NALOXONE HYDROCHLORIDE 0.4 MG/ML
.1-.4 INJECTION, SOLUTION INTRAMUSCULAR; INTRAVENOUS; SUBCUTANEOUS
Status: DISCONTINUED | OUTPATIENT
Start: 2019-03-20 | End: 2019-03-21 | Stop reason: HOSPADM

## 2019-03-20 RX ORDER — OXYTOCIN/0.9 % SODIUM CHLORIDE 30/500 ML
100-340 PLASTIC BAG, INJECTION (ML) INTRAVENOUS CONTINUOUS PRN
Status: COMPLETED | OUTPATIENT
Start: 2019-03-20 | End: 2019-03-20

## 2019-03-20 RX ORDER — EPHEDRINE SULFATE 50 MG/ML
5 INJECTION, SOLUTION INTRAMUSCULAR; INTRAVENOUS; SUBCUTANEOUS
Status: DISCONTINUED | OUTPATIENT
Start: 2019-03-20 | End: 2019-03-21 | Stop reason: HOSPADM

## 2019-03-20 RX ORDER — CARBOPROST TROMETHAMINE 250 UG/ML
250 INJECTION, SOLUTION INTRAMUSCULAR
Status: DISCONTINUED | OUTPATIENT
Start: 2019-03-20 | End: 2019-03-21 | Stop reason: HOSPADM

## 2019-03-20 RX ORDER — LIDOCAINE HYDROCHLORIDE AND EPINEPHRINE 15; 5 MG/ML; UG/ML
3 INJECTION, SOLUTION EPIDURAL
Status: COMPLETED | OUTPATIENT
Start: 2019-03-20 | End: 2019-03-20

## 2019-03-20 RX ORDER — NALBUPHINE HYDROCHLORIDE 10 MG/ML
2.5-5 INJECTION, SOLUTION INTRAMUSCULAR; INTRAVENOUS; SUBCUTANEOUS EVERY 6 HOURS PRN
Status: DISCONTINUED | OUTPATIENT
Start: 2019-03-20 | End: 2019-03-21 | Stop reason: HOSPADM

## 2019-03-20 RX ORDER — OXYCODONE AND ACETAMINOPHEN 5; 325 MG/1; MG/1
1 TABLET ORAL
Status: DISCONTINUED | OUTPATIENT
Start: 2019-03-20 | End: 2019-03-21 | Stop reason: HOSPADM

## 2019-03-20 RX ORDER — ROPIVACAINE HYDROCHLORIDE 2 MG/ML
10 INJECTION, SOLUTION EPIDURAL; INFILTRATION; PERINEURAL ONCE
Status: COMPLETED | OUTPATIENT
Start: 2019-03-20 | End: 2019-03-20

## 2019-03-20 RX ORDER — METHYLERGONOVINE MALEATE 0.2 MG/ML
200 INJECTION INTRAVENOUS
Status: DISCONTINUED | OUTPATIENT
Start: 2019-03-20 | End: 2019-03-21 | Stop reason: HOSPADM

## 2019-03-20 RX ORDER — IBUPROFEN 400 MG/1
800 TABLET, FILM COATED ORAL EVERY 6 HOURS PRN
Status: DISCONTINUED | OUTPATIENT
Start: 2019-03-20 | End: 2019-03-21 | Stop reason: HOSPADM

## 2019-03-20 RX ORDER — HYDROCORTISONE 2.5 %
CREAM (GRAM) TOPICAL 3 TIMES DAILY PRN
Status: DISCONTINUED | OUTPATIENT
Start: 2019-03-20 | End: 2019-03-21 | Stop reason: HOSPADM

## 2019-03-20 RX ORDER — ONDANSETRON 2 MG/ML
4 INJECTION INTRAMUSCULAR; INTRAVENOUS EVERY 6 HOURS PRN
Status: DISCONTINUED | OUTPATIENT
Start: 2019-03-20 | End: 2019-03-21 | Stop reason: HOSPADM

## 2019-03-20 RX ORDER — IBUPROFEN 400 MG/1
800 TABLET, FILM COATED ORAL
Status: DISCONTINUED | OUTPATIENT
Start: 2019-03-20 | End: 2019-03-21 | Stop reason: HOSPADM

## 2019-03-20 RX ORDER — AMOXICILLIN 250 MG
1 CAPSULE ORAL 2 TIMES DAILY
Status: DISCONTINUED | OUTPATIENT
Start: 2019-03-20 | End: 2019-03-21 | Stop reason: HOSPADM

## 2019-03-20 RX ORDER — OXYTOCIN/0.9 % SODIUM CHLORIDE 30/500 ML
340 PLASTIC BAG, INJECTION (ML) INTRAVENOUS CONTINUOUS PRN
Status: DISCONTINUED | OUTPATIENT
Start: 2019-03-20 | End: 2019-03-21 | Stop reason: HOSPADM

## 2019-03-20 RX ORDER — SODIUM CHLORIDE, SODIUM LACTATE, POTASSIUM CHLORIDE, CALCIUM CHLORIDE 600; 310; 30; 20 MG/100ML; MG/100ML; MG/100ML; MG/100ML
INJECTION, SOLUTION INTRAVENOUS CONTINUOUS
Status: DISCONTINUED | OUTPATIENT
Start: 2019-03-20 | End: 2019-03-21 | Stop reason: HOSPADM

## 2019-03-20 RX ORDER — OXYTOCIN/0.9 % SODIUM CHLORIDE 30/500 ML
100 PLASTIC BAG, INJECTION (ML) INTRAVENOUS CONTINUOUS
Status: DISCONTINUED | OUTPATIENT
Start: 2019-03-20 | End: 2019-03-21 | Stop reason: HOSPADM

## 2019-03-20 RX ORDER — LANOLIN 100 %
OINTMENT (GRAM) TOPICAL
Status: DISCONTINUED | OUTPATIENT
Start: 2019-03-20 | End: 2019-03-21 | Stop reason: HOSPADM

## 2019-03-20 RX ORDER — ACETAMINOPHEN 325 MG/1
650 TABLET ORAL EVERY 4 HOURS PRN
Status: DISCONTINUED | OUTPATIENT
Start: 2019-03-20 | End: 2019-03-21 | Stop reason: HOSPADM

## 2019-03-20 RX ORDER — ONDANSETRON 4 MG/1
4 TABLET, ORALLY DISINTEGRATING ORAL EVERY 6 HOURS PRN
Status: DISCONTINUED | OUTPATIENT
Start: 2019-03-20 | End: 2019-03-21 | Stop reason: HOSPADM

## 2019-03-20 RX ORDER — FENTANYL CITRATE 50 UG/ML
50-100 INJECTION, SOLUTION INTRAMUSCULAR; INTRAVENOUS
Status: DISCONTINUED | OUTPATIENT
Start: 2019-03-20 | End: 2019-03-21 | Stop reason: HOSPADM

## 2019-03-20 RX ORDER — BISACODYL 10 MG
10 SUPPOSITORY, RECTAL RECTAL DAILY PRN
Status: DISCONTINUED | OUTPATIENT
Start: 2019-03-22 | End: 2019-03-21 | Stop reason: HOSPADM

## 2019-03-20 RX ADMIN — ACETAMINOPHEN 650 MG: 325 TABLET, FILM COATED ORAL at 16:56

## 2019-03-20 RX ADMIN — IBUPROFEN 800 MG: 400 TABLET ORAL at 13:59

## 2019-03-20 RX ADMIN — Medication 12 ML/HR: at 03:21

## 2019-03-20 RX ADMIN — ACETAMINOPHEN 650 MG: 325 TABLET, FILM COATED ORAL at 07:45

## 2019-03-20 RX ADMIN — IBUPROFEN 800 MG: 400 TABLET ORAL at 19:57

## 2019-03-20 RX ADMIN — IBUPROFEN 800 MG: 400 TABLET ORAL at 07:45

## 2019-03-20 RX ADMIN — SODIUM CHLORIDE, POTASSIUM CHLORIDE, SODIUM LACTATE AND CALCIUM CHLORIDE 1000 ML: 600; 310; 30; 20 INJECTION, SOLUTION INTRAVENOUS at 02:41

## 2019-03-20 RX ADMIN — ROPIVACAINE HYDROCHLORIDE 10 ML: 2 INJECTION, SOLUTION EPIDURAL; INFILTRATION at 03:18

## 2019-03-20 RX ADMIN — ACETAMINOPHEN 650 MG: 325 TABLET, FILM COATED ORAL at 23:49

## 2019-03-20 RX ADMIN — LIDOCAINE HYDROCHLORIDE,EPINEPHRINE BITARTRATE 3 ML: 15; .005 INJECTION, SOLUTION EPIDURAL; INFILTRATION; INTRACAUDAL; PERINEURAL at 03:13

## 2019-03-20 RX ADMIN — SENNOSIDES AND DOCUSATE SODIUM 1 TABLET: 8.6; 5 TABLET ORAL at 19:57

## 2019-03-20 RX ADMIN — ACETAMINOPHEN 650 MG: 325 TABLET, FILM COATED ORAL at 20:55

## 2019-03-20 RX ADMIN — OXYTOCIN-SODIUM CHLORIDE 0.9% IV SOLN 30 UNIT/500ML 340 ML/HR: 30-0.9/5 SOLUTION at 06:05

## 2019-03-20 RX ADMIN — ACETAMINOPHEN 650 MG: 325 TABLET, FILM COATED ORAL at 12:42

## 2019-03-20 ASSESSMENT — MIFFLIN-ST. JEOR: SCORE: 1701.98

## 2019-03-20 NOTE — H&P
"[facility] Labor and Delivery Progress Note    Melissa Aguirre MRN# [id number]   Age: 33 year old YOB: 1985     Refer to prenatal record for full H&P        Subjective:     33 yr old  at 40+2 presented with SROM and active labor; uncomplicated prenatal care          Objective:     Patient Vitals for the past 8 hrs:   BP Temp Temp src Pulse Resp SpO2 Height Weight   19 0645 127/67 -- -- -- -- -- -- --   19 0630 124/56 -- -- -- -- -- -- --   19 0615 126/57 -- -- -- -- -- -- --   19 0600 123/84 -- -- -- -- -- -- --   19 0500 133/61 -- -- -- -- 96 % -- --   19 0445 126/57 -- -- -- -- 96 % -- --   19 0430 129/60 -- -- -- -- 97 % -- --   19 0400 140/64 -- -- -- -- 97 % -- --   19 0345 139/70 -- -- -- -- 98 % -- --   19 0340 132/72 -- -- -- -- 98 % -- --   19 0334 131/65 -- -- -- -- -- -- --   19 0332 129/68 -- -- -- -- -- -- --   19 0330 134/76 97.2  F (36.2  C) Temporal -- -- 98 % -- --   198 145/77 -- -- -- -- -- -- --   196 141/70 -- -- -- -- -- -- --   194 138/73 -- -- -- -- -- -- --   192 141/71 -- -- -- -- -- -- --   195 150/78 -- -- -- -- 100 % -- --   19 0305 (!) 144/92 -- -- -- -- -- -- --   19 0135 134/72 -- -- -- -- -- -- --   19 0052 140/84 98.7  F (37.1  C) Temporal 103 16 -- 1.727 m (5' 8\") 94.3 kg (208 lb)        Cervical Exam:  Complete; +3  Membranes: Ruptured , Leaking     Fetal Heart Rate:    Monitor:      Variability:      Baseline Rate:      Fetal Heart Rate : cat 1    vtx  EFW 7 #  A +  GBS neg        Assessment:   1)  IUP at  40w2d   2)  Active labor  3) epidural        Plan:   Prepare for active 2nd stage of labor  Anticipate       Helen Kaleigh Patterson MD        "

## 2019-03-20 NOTE — PLAN OF CARE
Patient up ambulating and tolerating well.  Taking Ibuprofen and tylenol for discomfort.  Fundus is firm slightly to L without free flow or clots.  Voiding without difficulty.  DACIA cerna'booker.

## 2019-03-20 NOTE — PLAN OF CARE
Data: Melissa Aguirre transferred to Room 409 via wheelchair at 0850. Baby transferred via parent's arms.  Action: Receiving unit notified of transfer: Yes. Patient and family notified of room change. Report given to Haydee FRANK at 0900. Belongings sent to receiving unit. Accompanied by Registered Nurse. Oriented patient to surroundings. Call light within reach. ID bands double-checked with receiving RN. Pt unable to void prior to transfer, but attempting currently.  Response: Patient tolerated transfer and is stable.

## 2019-03-20 NOTE — LACTATION NOTE
Initial visit with LORETTA Torres and baby.  Parents state they are bottle feeding formula.    Advised to  Wear tight fitting bra and to avoid stimulation to nipples.  Use Ice packs.  No further questions at this time.   Maribell DONALDSONN, RN, PHN, RNC-MNN, IBCLC

## 2019-03-20 NOTE — ANESTHESIA PREPROCEDURE EVALUATION
"Anesthesia Pre-Procedure Evaluation    Patient: Melissa Aguirre   MRN: [id number] : 1985          Preoperative Diagnosis: * No surgery found *        Past Medical History:   Diagnosis Date     IUD (intrauterine device) in place -    actinomyces seen on pap 2012-pt asymptomatic      NO ACTIVE PROBLEMS      Past Surgical History:   Procedure Laterality Date     NO HISTORY OF SURGERY                          Lab Results   Component Value Date    WBC 5.9 2015    HGB 12.2 2018    HCT 37.7 2018    PLT 320K 2018    SED 4 10/07/2013     10/07/2013    POTASSIUM 4.1 10/07/2013    CHLORIDE 100 10/07/2013    CO2 26 10/07/2013    BUN 11 10/07/2013    CR 0.62 10/07/2013    GLC 96 10/07/2013    ERINN 9.3 10/07/2013    ALBUMIN 4.7 10/07/2013    PROTTOTAL 8.1 10/07/2013    ALT 27 10/07/2013    AST 18 10/07/2013    ALKPHOS 68 10/07/2013    BILITOTAL 0.5 10/07/2013    LIPASE 93 10/07/2013    AMYLASE 55 10/07/2013    HCG Negative 10/07/2013    HCGS Negative 2016       Preop Vitals  BP Readings from Last 3 Encounters:   19 134/72   19 128/84   17 119/78    Pulse Readings from Last 3 Encounters:   19 103   19 91   17 85      Resp Readings from Last 3 Encounters:   19 16   19 16   17 16    SpO2 Readings from Last 3 Encounters:   19 99%   17 99%   17 100%      Temp Readings from Last 1 Encounters:   19 37.1  C (98.7  F) (Temporal)    Ht Readings from Last 1 Encounters:   19 1.727 m (5' 8\")      Wt Readings from Last 1 Encounters:   19 94.3 kg (208 lb)    Estimated body mass index is 31.63 kg/m  as calculated from the following:    Height as of this encounter: 1.727 m (5' 8\").    Weight as of this encounter: 94.3 kg (208 lb).       Anesthesia Plan      History & Physical Review      ASA Status:  2 .        Plan for Epidural          Postoperative Care      Consents                 VAL MICHELLE MD  "

## 2019-03-20 NOTE — PLAN OF CARE
Patient arrived in triage at 0045 stating bag of water broke, Patient is  at 40 2/7 weeks, denies problems with pregnancy, Prenatal show a resolved low laying Placenta and possible VSD on U/S but baby had normal fetal echo. Patient states is starting to have some contractions now. Monitors applied with consent. No fluid seen on chux so ROM plus obtained and sent. SVE 3-4 cms, 70%. -1 station. Vertex palpated. Baby initially moderate variability but no accelerations seen, Patient up to bathroom then repositioned onto left side. Baby immediately had accelerations. Contractions occurring every 3-5 minutes. Large gush of fluid. ROM plus positive. 0153 Dr. Patterson updated re patient arrival. FHR tracing, contractions and  SVE. Patient Negative for GBS. Admission orders obtained . Patient may have epidural when ready, okay for peterson after epidural. If contractions space out or no cervical change update MD for further orders. Otherwise call for delivery.  0145- monitors off. Patient walked to room 219. Report to Sherri Waldrop RN

## 2019-03-20 NOTE — ANESTHESIA PROCEDURE NOTES
Peripheral nerve/Neuraxial procedure note : epidural catheter  Pre-Procedure  Performed by Leroy Penn MD  Location: OB      Pre-Anesthestic Checklist: patient identified, IV checked, risks and benefits discussed, informed consent, monitors and equipment checked, pre-op evaluation and at physician/surgeon's request    Timeout  Correct Patient: Yes   Correct Procedure: Yes   Correct Site: Yes   Correct Laterality: N/A   Correct Position: Yes   Site Marked: N/A   .   Procedure Documentation    .    Procedure:    Epidural catheter.  Insertion Site:L4-5  (midline approach) Injection technique: LORT saline   Local skin infiltrated with mL of 1% lidocaine.  CRISTIANE at 5 cm     Patient Prep;mask, sterile gloves, povidone-iodine 7.5% surgical scrub, patient draped.  .  Needle: Touhy needle Needle Gauge: 17.    Needle Length (Inches) 3.5  # of attempts: 1 and # of redirects:  .   . .  Catheter threaded easily  .  10 cm at skin.   .    Assessment/Narrative  Paresthesias: No.  .  .  Aspiration negative for heme or CSF  . Test dose of 3 mL lidocaine 1.5% w/ 1:200,000 epinephrine at. Test dose negative for signs of intravascular, subdural or intrathecal injection. Comments:  No complications.  Catheter secured with sterile dressing and tape.  Questions answered.

## 2019-03-21 VITALS
SYSTOLIC BLOOD PRESSURE: 121 MMHG | HEART RATE: 95 BPM | TEMPERATURE: 98.1 F | WEIGHT: 208 LBS | HEIGHT: 68 IN | OXYGEN SATURATION: 96 % | RESPIRATION RATE: 16 BRPM | BODY MASS INDEX: 31.52 KG/M2 | DIASTOLIC BLOOD PRESSURE: 77 MMHG

## 2019-03-21 LAB — HGB BLD-MCNC: 9.8 G/DL (ref 11.7–15.7)

## 2019-03-21 PROCEDURE — 0HQ9XZZ REPAIR PERINEUM SKIN, EXTERNAL APPROACH: ICD-10-PCS | Performed by: OBSTETRICS & GYNECOLOGY

## 2019-03-21 PROCEDURE — 36415 COLL VENOUS BLD VENIPUNCTURE: CPT | Performed by: OBSTETRICS & GYNECOLOGY

## 2019-03-21 PROCEDURE — 25000132 ZZH RX MED GY IP 250 OP 250 PS 637: Performed by: OBSTETRICS & GYNECOLOGY

## 2019-03-21 PROCEDURE — 85018 HEMOGLOBIN: CPT | Performed by: OBSTETRICS & GYNECOLOGY

## 2019-03-21 RX ORDER — ACETAMINOPHEN 325 MG/1
650 TABLET ORAL EVERY 6 HOURS PRN
COMMUNITY
Start: 2019-03-21 | End: 2022-05-13

## 2019-03-21 RX ORDER — IBUPROFEN 800 MG/1
800 TABLET, FILM COATED ORAL EVERY 8 HOURS PRN
COMMUNITY
Start: 2019-03-21 | End: 2022-05-13

## 2019-03-21 RX ADMIN — SENNOSIDES AND DOCUSATE SODIUM 1 TABLET: 8.6; 5 TABLET ORAL at 09:00

## 2019-03-21 RX ADMIN — ACETAMINOPHEN 650 MG: 325 TABLET, FILM COATED ORAL at 09:00

## 2019-03-21 RX ADMIN — IBUPROFEN 800 MG: 400 TABLET ORAL at 02:49

## 2019-03-21 RX ADMIN — IBUPROFEN 800 MG: 400 TABLET ORAL at 09:00

## 2019-03-21 NOTE — PLAN OF CARE
Vital signs are stable and pain is controlled with  tylenol and Ibuprofen medications.  She is tolerating diet, voiding, and up independently in room.  Fundus if firm/U.  She and spouse are doing all baby cares.  They are hoping to discharge in am.  All questions answered

## 2019-03-21 NOTE — PLAN OF CARE
Vital signs are stable and pain is controlled with oral medications.  She is tolerating diet, voiding, and up independently in room.  Fundus if firm and right of the umbilicus.  She and spouse are doing all baby cares.  They are hoping to discharge in am.  All questions answered.

## 2019-03-21 NOTE — PLAN OF CARE
VSS, fundus firm with no free flow or clots.  Plans to discharge today, awaiting peds to round.  Enc to call with needs, questions and concerns.      D: VSS, assessments WDL.   I: Pt. received complete discharge paperwork and home medications.  Pt. was given times of last dose for all discharge medications in writing on discharge medication sheets.  Discharge teaching included home medication, pain management, activity restrictions, postpartum cares, and signs and symptoms of infection.    A: Discharge outcomes on care plan met.  Mother states understanding and comfort with self and baby cares.  P: Pt. discharged to home.  Pt. was discharged with baby, and bands were checked at time of discharge.  Pt. was accompanied by , nurse and baby, and left with personal belongings.  Home care referral made.  Pt. to follow up with OB per MD order.  Pt. had no further questions at the time of discharge and no unmet needs were identified.

## 2019-03-21 NOTE — PROGRESS NOTES
"OB Post-partum Note  PPD# 1    S:  Patient doing well.  Pain controlled with ibuprofen and tylenol.  Voiding and ambulating without difficulty. No bm yet.  Bleeding stable.  Formula feeding    O:  /77   Pulse 95   Temp 98.1  F (36.7  C) (Oral)   Resp 16   Ht 1.727 m (5' 8\")   Wt 94.3 kg (208 lb)   LMP 2018   SpO2 96%   Breastfeeding? Unknown   BMI 31.63 kg/m    Gen- A&O, NAD  Abd- Non-tender, fundus firm at umbilicus  Ext- non-tender, minimal edema  Perineum - deferred    A+  Rubella Immune    A/P: 33 year old  PPD# 1 s/p     1.  Routine post-partum cares.  2.  Reviewed reportable symptoms, plan f/u in clinic at 6 wks pp.  3. Discharge today.    Danisha Griffin CNM  3/21/2019  8:35 AM  "

## 2019-04-02 NOTE — L&D DELIVERY NOTE
Delivery Date:  2019      HISTORY OF PRESENT ILLNESS:  The patient is a 33-year-old  2, para 1-0-0-1, who presented to Labor and Delivery early on the morning of 2019 at 40 weeks 2 days' gestation in active labor.  The patient had an uncomplicated prenatal course.      PRENATAL LABORATORIES:  Include blood type of A positive, GBS negative, serology nonreactive, rubella immune      PAST MEDICAL AND SURGICAL HISTORY:  Noncontributory.      OBSTETRICAL HISTORY:  Full-term normal spontaneous vaginal delivery without complication.      HOSPITAL COURSE:  The patient arrived on Labor and Delivery, had had spontaneous rupture of membranes shortly upon prior to arrival with clear fluid noted.  She was actively leighton.  Was found to be approximately 4 cm, 80%, -2.  There was a category 1 fetal heart tracing, and patient received an immediate epidural for pain management.      The patient rapidly progressed along a normal labor curve.  Was noted to be completely dilated at 5:35 a.m.  I was already in-house and presented at the bedside.  Maternal expulsive  efforts were begun and at 5:56, the patient delivered  a female infant over an intact perineum in the left occiput anterior position.  Weight of the infant was 3290 grams and Apgars were 8 at 1 minute, 9 at 5 minutes.      Placenta delivered shortly after 6 a.m. spontaneously and grossly intact.  Fundus was immediately firm and IV Pitocin was hung.      Examination of the perineum showed there to be a small first-degree tear that was repaired using 0 Vicryl in normal fashion.  Total EBL was measured to be approximately 400 mL      At completion of delivery, mom and baby were doing well.  Sponge, lap and needle counts were correct x 2.         INGRIS GARCIA MD             D: 2019   T: 2019   MT: [id number]      Name:     SHERWIN SIFUENTES   MRN:      [id number]        Account:        [id number]   :      1985        Delivery Date:   03/20/2019               Document: [id number]

## 2019-07-15 ENCOUNTER — OFFICE VISIT (OUTPATIENT)
Dept: DERMATOLOGY | Facility: CLINIC | Age: 34
End: 2019-07-15
Payer: COMMERCIAL

## 2019-07-15 VITALS — OXYGEN SATURATION: 97 % | DIASTOLIC BLOOD PRESSURE: 89 MMHG | SYSTOLIC BLOOD PRESSURE: 131 MMHG | HEART RATE: 93 BPM

## 2019-07-15 DIAGNOSIS — B35.1 ONYCHOMYCOSIS: ICD-10-CM

## 2019-07-15 DIAGNOSIS — L81.4 LENTIGINES: ICD-10-CM

## 2019-07-15 DIAGNOSIS — D18.01 CHERRY ANGIOMA: ICD-10-CM

## 2019-07-15 DIAGNOSIS — D48.5 NEOPLASM OF UNCERTAIN BEHAVIOR OF SKIN: ICD-10-CM

## 2019-07-15 DIAGNOSIS — L91.0 KELOID: ICD-10-CM

## 2019-07-15 DIAGNOSIS — D22.9 MULTIPLE NEVI: Primary | ICD-10-CM

## 2019-07-15 PROCEDURE — 11103 TANGNTL BX SKIN EA SEP/ADDL: CPT | Performed by: PHYSICIAN ASSISTANT

## 2019-07-15 PROCEDURE — 11102 TANGNTL BX SKIN SINGLE LES: CPT | Performed by: PHYSICIAN ASSISTANT

## 2019-07-15 PROCEDURE — 99203 OFFICE O/P NEW LOW 30 MIN: CPT | Mod: 25 | Performed by: PHYSICIAN ASSISTANT

## 2019-07-15 PROCEDURE — 88305 TISSUE EXAM BY PATHOLOGIST: CPT | Mod: TC | Performed by: PHYSICIAN ASSISTANT

## 2019-07-15 RX ORDER — CICLOPIROX 80 MG/ML
SOLUTION TOPICAL
Qty: 6.6 ML | Refills: 3 | Status: SHIPPED | OUTPATIENT
Start: 2019-07-15 | End: 2024-02-13

## 2019-07-15 NOTE — PROGRESS NOTES
HPI:  Melissa Aguirre is a 34 year old female patient here today for spot on left flank .  Patient states this has been present for a while.  Patient reports the following symptoms: growing, new .  Patient reports the following previous treatments: none.  Patient reports the following modifying factors: none.  Associated symptoms: none.  Patient has no other skin complaints today.  Remainder of the HPI, Meds, PMH, Allergies, FH, and SH was reviewed in chart.    Pertinent Hx:   Keloid scarring   Past Medical History:   Diagnosis Date     IUD (intrauterine device) in place 2010-4/12    actinomyces seen on pap 11/2012-pt asymptomatic      NO ACTIVE PROBLEMS        Past Surgical History:   Procedure Laterality Date     NO HISTORY OF SURGERY          Family History   Problem Relation Age of Onset     Depression Mother      Eye Disorder Mother      Gynecology Mother         endometriosis     Hypertension Father      Eye Disorder Father      Lipids Father      Cardiovascular Maternal Grandmother      Eye Disorder Maternal Grandmother      Thyroid Disease Maternal Grandmother      Diabetes Maternal Grandfather      Hypertension Maternal Grandfather      Eye Disorder Other         self     Genitourinary Problems Other         self-     Gynecology Other         self-endometriosis     Musculoskeletal Disorder Other         self=TMJ       Social History     Socioeconomic History     Marital status:      Spouse name: Not on file     Number of children: 0     Years of education: Not on file     Highest education level: Not on file   Occupational History     Not on file   Social Needs     Financial resource strain: Not on file     Food insecurity:     Worry: Not on file     Inability: Not on file     Transportation needs:     Medical: Not on file     Non-medical: Not on file   Tobacco Use     Smoking status: Never Smoker     Smokeless tobacco: Never Used   Substance and Sexual Activity     Alcohol use: No     Alcohol/week: 0.0  oz     Comment: none during pregnancy     Drug use: No     Sexual activity: Yes     Partners: Male     Birth control/protection: None   Lifestyle     Physical activity:     Days per week: Not on file     Minutes per session: Not on file     Stress: Not on file   Relationships     Social connections:     Talks on phone: Not on file     Gets together: Not on file     Attends Spiritism service: Not on file     Active member of club or organization: Not on file     Attends meetings of clubs or organizations: Not on file     Relationship status: Not on file     Intimate partner violence:     Fear of current or ex partner: Not on file     Emotionally abused: Not on file     Physically abused: Not on file     Forced sexual activity: Not on file   Other Topics Concern     Parent/sibling w/ CABG, MI or angioplasty before 65F 55M? Not Asked   Social History Narrative     Not on file       Outpatient Encounter Medications as of 7/15/2019   Medication Sig Dispense Refill     acetaminophen (TYLENOL) 325 MG tablet Take 2 tablets (650 mg) by mouth every 6 hours as needed for mild pain or fever (greater than or equal to 38  C /100.4  F (oral) or 38.5  C/ 101.4  F (core).) (Patient not taking: Reported on 7/15/2019)       ibuprofen (ADVIL/MOTRIN) 800 MG tablet Take 1 tablet (800 mg) by mouth every 8 hours as needed for moderate pain (mild-moderate pain) (Patient not taking: Reported on 7/15/2019)       Prenatal Vit-Fe Fumarate-FA (PRENATAL MULTIVITAMIN  PLUS IRON) 27-0.8 MG TABS Take 1 tablet by mouth daily 100 tablet 3     No facility-administered encounter medications on file as of 7/15/2019.        Review Of Systems:  Skin: As above  Eyes: negative  Ears/Nose/Throat: negative  Respiratory: No shortness of breath, dyspnea on exertion, cough, or hemoptysis  Cardiovascular: negative  Gastrointestinal: negative  Genitourinary: negative  Musculoskeletal: negative  Neurologic: negative  Psychiatric:  negative  Hematologic/Lymphatic/Immunologic: negative  Endocrine: negative      Objective:     /89   Pulse 93   SpO2 97%   Breastfeeding? No   Eyes: Conjunctivae/lids: Normal   ENT: Lips:  Normal  MSK: Normal  Cardiovascular: Peripheral edema none  Pulm: Breathing Normal  Neuro/Psych: Orientation: Normal; Mood/Affect: Normal, NAD, WDWN  Pt accompanied by: infant  Following areas examined: Scalp, face, eyelids, lips, neck, chest, abdomen, back, buttocks, and R&L upper and lower extremities. Pt defers exam of groin.  Aldrich skin type:i   Findings:  Red smooth well-defined macules on trunk and extremities.  Well circumscribed macules with symmetric color distribution on trunk and extremities.  Tan WD smooth macules on face, neck, trunk, and extremities.  Thickening of toenails-pt wearing toenail polish.   Dark brown irregularly pigmented smooth macule on left flank 0.7cm  Smooth firm pink papule on left inferior flank 0.6cm  Smooth flesh colored papule on right posterior earlobe    Assessment and Plan:     1) Cherry angiomas, Benign nevi, Lentigines     I discussed the specifics of tumor, prognosis, and genetics of benign lesions.  I explained that treatment of these lesions would be purely cosmetic and not medically neccessary.  I discussed with patient different removal options including excision, cryotherapy, cautery and /or laser.  Lesion may recur and/or may not completely resolve. May need additional treatment.     2) onychomycosis of toenails  Discussed treatment options with OTC products including daily application of tea tree oil, Vicks vapor rub, and/or apple cider vinegar soaks. Disc oral agents and liver function tests required at baseline, 6 weeks after treatment begins, and then once treatment ends. Prescription strength topicals are available, can use up to 48 weeks. Fingernails take 3-6 months to regrow completely, and toenails up to 12-18 months.   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  No complications and routine wound care.  May grow back and will get a scar. Based on lesion type may need to completely remove lesion. Patient will be notified in 7-10 days of results. Wound care directions given.    4) Neoplasm of uncertain behavior left flank 0.7cm  Rule out atypical nevus  TANGENTIAL BIOPSY:  After consent, anesthesia with LEC and prep, tangential biopsy performed.  No complications and routine wound care.  May grow back and will get a scar. Based on lesion type may need to completely remove lesion. Patient will be notified in 7-10 days of results. Wound care directions given.    5) keloid  Watch and monitor  Treatment available if pt desires.   Signs and Symptoms of non-melanoma skin cancer and ABCDEs of melanoma reviewed with patient. Patient encouraged to perform monthly self skin exams and educated on how to perform them. UV precautions reviewed with patient. Patient was asked about new or changing moles/lesions on body.   Wear a sunscreen with at least SPF 30 on your face, ears, neck and V of the chest daily. Wear sunscreen on other areas of the body if those areas are exposed to the sun throughout the day. Sunscreens can contain physical and/or chemical blockers. Physical blockers are less likely to clog pores, these include zinc oxide and titanium dioxide. Reapply every two hour and after swimming. Sunscreen examples include Neutrogena, CeraVe, Blue Lizard, Elta MD and many others.    Proper skin care from [facility]:    -Eliminate harsh soaps as they strip the natural oils from the skin, often resulting in dry itchy skin ( i.e. Dial, Zest, [place] Spring)  -Use mild soaps such as Cetaphil or Dove Sensitive Skin in the shower. You do not need to use soap on arms, legs, and trunk every time you shower  unless visibly soiled.   -Avoid hot or cold showers.  -After showering, lightly dry off and apply moisturizing within 2-3 minutes. This will help trap moisture in the skin.   -Aggressive use of a moisturizer at least 1-2 times a day to the entire body (including -Vanicream, Cetaphil, Aquaphor or Cerave) and moisturize hands after every washing.  -We recommend using moisturizers that come in a tub that needs to be scooped out, not a pump. This has more of an oil base. It will hold moisture in your skin much better than a water base moisturizer. The above recommended are non-pore clogging.    Melissa to follow up with Primary Care provider regarding elevated blood pressure.            Follow up in 2-3 months to recheck nails

## 2019-07-15 NOTE — PATIENT INSTRUCTIONS
Wound Care Instructions     FOR SUPERFICIAL WOUNDS     [facility] [phone]    [facility] [phone]          AFTER 24 HOURS YOU SHOULD REMOVE THE BANDAGE AND BEGIN DAILY DRESSING CHANGES AS FOLLOWS:     1) Remove Dressing.     2) Clean and dry the area with tap water using a Q-tip or sterile gauze pad.     3) Apply Vaseline, Aquaphor, Polysporin ointment or Bacitracin ointment over entire wound.  Do NOT use Neosporin ointment.     4) Cover the wound with a band-aid, or a sterile non-stick gauze pad and micropore paper tape      REPEAT THESE INSTRUCTIONS AT LEAST ONCE A DAY UNTIL THE WOUND HAS COMPLETELY HEALED.    It is an old wives tale that a wound heals better when it is exposed to air and allowed to dry out. The wound will heal faster with a better cosmetic result if it is kept moist with ointment and covered with a bandage.    **Do not let the wound dry out.**      Supplies Needed:      *Cotton tipped applicators (Q-tips)    *Polysporin Ointment or Bacitracin Ointment (NOT NEOSPORIN)    *Band-aids or non-stick gauze pads and micropore paper tape.      PATIENT INFORMATION:    During the healing process you will notice a number of changes. All wounds develop a small halo of redness surrounding the wound.  This means healing is occurring. Severe itching with extensive redness usually indicates sensitivity to the ointment or bandage tape used to dress the wound.  You should call our office if this develops.      Swelling  and/or discoloration around your surgical site is common, particularly when performed around the eye.    All wounds normally drain.  The larger the wound the more drainage there will be.  After 7-10 days, you will notice the wound beginning to shrink and new skin will begin to grow.  The wound is healed when you can see skin has formed over the entire area.  A healed wound has a healthy, shiny look to the surface and is red to dark pink in color to  normalize.  Wounds may take approximately 4-6 weeks to heal.  Larger wounds may take 6-8 weeks.  After the wound is healed you may discontinue dressing changes.    You may experience a sensation of tightness as your wound heals. This is normal and will gradually subside.    Your healed wound may be sensitive to temperature changes. This sensitivity improves with time, but if you re having a lot of discomfort, try to avoid temperature extremes.    Patients frequently experience itching after their wound appears to have healed because of the continue healing under the skin.  Plain Vaseline will help relieve the itching.        POSSIBLE COMPLICATIONS    BLEEDIN. Leave the bandage in place.  2. Use tightly rolled up gauze or a cloth to apply direct pressure over the bandage for 30  minutes.  3. Reapply pressure for an additional 30 minutes if necessary  4. Use additional gauze and tape to maintain pressure once the bleeding has stopped.          Proper skin care from [facility]:    -Eliminate harsh soaps as they strip the natural oils from the skin, often resulting in dry itchy skin ( i.e. Dial, Zest, [place] Spring)  -Use mild soaps such as Cetaphil or Dove Sensitive Skin in the shower. You do not need to use soap on arms, legs, and trunk every time you shower unless visibly soiled.   -Avoid hot or cold showers.  -After showering, lightly dry off and apply moisturizing within 2-3 minutes. This will help trap moisture in the skin.   -Aggressive use of a moisturizer at least 1-2 times a day to the entire body (including -Vanicream, Cetaphil, Aquaphor or Cerave) and moisturize hands after every washing.  -We recommend using moisturizers that come in a tub that needs to be scooped out, not a pump. This has more of an oil base. It will hold moisture in your skin much better than a water base moisturizer. The above recommended are non-pore clogging.      Wear a sunscreen with at least SPF 30 on your face,  ears, neck and V of the chest daily. Wear sunscreen on other areas of the body if those areas are exposed to the sun throughout the day. Sunscreens can contain physical and/or chemical blockers. Physical blockers are less likely to clog pores, these include zinc oxide and titanium dioxide. Reapply every two hour and after swimming. Sunscreen examples include Neutrogena, CeraVe, Blue Lizard, Elta MD and many others.    UV radiation  UVA radiation remains constant throughout the day and throughout the year. It is a longer wavelength than UVB and therefore penetrates deeper into the skin leading to immediate and delayed tanning, photoaging, and skin cancer. 70-80% of UVA and UVB radiation occurs between the hours of 10am-2pm.  UVB radiation  UVB radiation causes the most harmful effects and is more significant during the summer months. However, snow and ice can reflect UVB radiation leading to skin damage during the winter months as well. UVB radiation is responsible for tanning, burning, inflammation, delayed erythema (pinkness), pigmentation (brown spots), and skin cancer.

## 2019-07-15 NOTE — LETTER
7/15/2019         RE: Melissa Aguirre  [address] [place] [address]        Dear Colleague,    Thank you for referring your patient, Melissa Aguirre, to the [facility]. Please see a copy of my visit note below.    HPI:  Melissa Aguirre is a 34 year old female patient here today for spot on left flank .  Patient states this has been present for a while.  Patient reports the following symptoms: growing, new .  Patient reports the following previous treatments: none.  Patient reports the following modifying factors: none.  Associated symptoms: none.  Patient has no other skin complaints today.  Remainder of the HPI, Meds, PMH, Allergies, FH, and SH was reviewed in chart.    Pertinent Hx:   Keloid scarring   Past Medical History:   Diagnosis Date     IUD (intrauterine device) in place 2010-4/12    actinomyces seen on pap 11/2012-pt asymptomatic      NO ACTIVE PROBLEMS        Past Surgical History:   Procedure Laterality Date     NO HISTORY OF SURGERY          Family History   Problem Relation Age of Onset     Depression Mother      Eye Disorder Mother      Gynecology Mother         endometriosis     Hypertension Father      Eye Disorder Father      Lipids Father      Cardiovascular Maternal Grandmother      Eye Disorder Maternal Grandmother      Thyroid Disease Maternal Grandmother      Diabetes Maternal Grandfather      Hypertension Maternal Grandfather      Eye Disorder Other         self     Genitourinary Problems Other         self-     Gynecology Other         self-endometriosis     Musculoskeletal Disorder Other         self=TMJ       Social History     Socioeconomic History     Marital status:      Spouse name: Not on file     Number of children: 0     Years of education: Not on file     Highest education level: Not on file   Occupational History     Not on file   Social Needs     Financial resource strain: Not on file     Food insecurity:     Worry: Not on file     Inability: Not on  file     Transportation needs:     Medical: Not on file     Non-medical: Not on file   Tobacco Use     Smoking status: Never Smoker     Smokeless tobacco: Never Used   Substance and Sexual Activity     Alcohol use: No     Alcohol/week: 0.0 oz     Comment: none during pregnancy     Drug use: No     Sexual activity: Yes     Partners: Male     Birth control/protection: None   Lifestyle     Physical activity:     Days per week: Not on file     Minutes per session: Not on file     Stress: Not on file   Relationships     Social connections:     Talks on phone: Not on file     Gets together: Not on file     Attends Sikhism service: Not on file     Active member of club or organization: Not on file     Attends meetings of clubs or organizations: Not on file     Relationship status: Not on file     Intimate partner violence:     Fear of current or ex partner: Not on file     Emotionally abused: Not on file     Physically abused: Not on file     Forced sexual activity: Not on file   Other Topics Concern     Parent/sibling w/ CABG, MI or angioplasty before 65F 55M? Not Asked   Social History Narrative     Not on file       Outpatient Encounter Medications as of 7/15/2019   Medication Sig Dispense Refill     acetaminophen (TYLENOL) 325 MG tablet Take 2 tablets (650 mg) by mouth every 6 hours as needed for mild pain or fever (greater than or equal to 38  C /100.4  F (oral) or 38.5  C/ 101.4  F (core).) (Patient not taking: Reported on 7/15/2019)       ibuprofen (ADVIL/MOTRIN) 800 MG tablet Take 1 tablet (800 mg) by mouth every 8 hours as needed for moderate pain (mild-moderate pain) (Patient not taking: Reported on 7/15/2019)       Prenatal Vit-Fe Fumarate-FA (PRENATAL MULTIVITAMIN  PLUS IRON) 27-0.8 MG TABS Take 1 tablet by mouth daily 100 tablet 3     No facility-administered encounter medications on file as of 7/15/2019.        Review Of Systems:  Skin: As above  Eyes: negative  Ears/Nose/Throat: negative  Respiratory: No  shortness of breath, dyspnea on exertion, cough, or hemoptysis  Cardiovascular: negative  Gastrointestinal: negative  Genitourinary: negative  Musculoskeletal: negative  Neurologic: negative  Psychiatric: negative  Hematologic/Lymphatic/Immunologic: negative  Endocrine: negative      Objective:     /89   Pulse 93   SpO2 97%   Breastfeeding? No   Eyes: Conjunctivae/lids: Normal   ENT: Lips:  Normal  MSK: Normal  Cardiovascular: Peripheral edema none  Pulm: Breathing Normal  Neuro/Psych: Orientation: Normal; Mood/Affect: Normal, NAD, WDWN  Pt accompanied by: infant  Following areas examined: Scalp, face, eyelids, lips, neck, chest, abdomen, back, buttocks, and R&L upper and lower extremities. Pt defers exam of groin.  Aldrich skin type:i   Findings:  Red smooth well-defined macules on trunk and extremities.  Well circumscribed macules with symmetric color distribution on trunk and extremities.  Tan WD smooth macules on face, neck, trunk, and extremities.  Thickening of toenails-pt wearing toenail polish.   Dark brown irregularly pigmented smooth macule on left flank 0.7cm  Smooth firm pink papule on left inferior flank 0.6cm  Smooth flesh colored papule on right posterior earlobe    Assessment and Plan:     1) Cherry angiomas, Benign nevi, Lentigines     I discussed the specifics of tumor, prognosis, and genetics of benign lesions.  I explained that treatment of these lesions would be purely cosmetic and not medically neccessary.  I discussed with patient different removal options including excision, cryotherapy, cautery and /or laser.  Lesion may recur and/or may not completely resolve. May need additional treatment.     2) onychomycosis of toenails  Discussed treatment options with OTC products including daily application of tea tree oil, Vicks vapor rub, and/or apple cider vinegar soaks. Disc oral agents and liver function tests required at baseline, 6 weeks after treatment begins, and then once  treatment ends. Prescription strength topicals are available, can use up to 48 weeks. Fingernails take 3-6 months to regrow completely, and toenails up to 12-18 months.   Begin ciclopirox night  3) Neoplasm of uncertain behavior left inferior flank 0.6cm  Dermatofibroma vs benign nevus doubt bcc  TANGENTIAL BIOPSY:  After consent, anesthesia with LEC and prep, tangential biopsy performed.  No complications and routine wound care.  May grow back and will get a scar. Based on lesion type may need to completely remove lesion. Patient will be notified in 7-10 days of results. Wound care directions given.    4) Neoplasm of uncertain behavior left flank 0.7cm  Rule out atypical nevus  TANGENTIAL BIOPSY:  After consent, anesthesia with LEC and prep, tangential biopsy performed.  No complications and routine wound care.  May grow back and will get a scar. Based on lesion type may need to completely remove lesion. Patient will be notified in 7-10 days of results. Wound care directions given.    5) keloid  Watch and monitor  Treatment available if pt desires.   Signs and Symptoms of non-melanoma skin cancer and ABCDEs of melanoma reviewed with patient. Patient encouraged to perform monthly self skin exams and educated on how to perform them. UV precautions reviewed with patient. Patient was asked about new or changing moles/lesions on body.   Wear a sunscreen with at least SPF 30 on your face, ears, neck and V of the chest daily. Wear sunscreen on other areas of the body if those areas are exposed to the sun throughout the day. Sunscreens can contain physical and/or chemical blockers. Physical blockers are less likely to clog pores, these include zinc oxide and titanium dioxide. Reapply every two hour and after swimming. Sunscreen examples include Neutrogena, CeraVe, Blue Lizard, Elta MD and many others.    Proper skin care from [facility]:    -Eliminate harsh soaps as they strip the natural oils from the skin,  often resulting in dry itchy skin ( i.e. Dial, Zest, [place] Spring)  -Use mild soaps such as Cetaphil or Dove Sensitive Skin in the shower. You do not need to use soap on arms, legs, and trunk every time you shower unless visibly soiled.   -Avoid hot or cold showers.  -After showering, lightly dry off and apply moisturizing within 2-3 minutes. This will help trap moisture in the skin.   -Aggressive use of a moisturizer at least 1-2 times a day to the entire body (including -Vanicream, Cetaphil, Aquaphor or Cerave) and moisturize hands after every washing.  -We recommend using moisturizers that come in a tub that needs to be scooped out, not a pump. This has more of an oil base. It will hold moisture in your skin much better than a water base moisturizer. The above recommended are non-pore clogging.    Melissa to follow up with Primary Care provider regarding elevated blood pressure.            Follow up in 2-3 months to recheck nails        Again, thank you for allowing me to participate in the care of your patient.        Sincerely,        Nikki Richard PA-C

## 2019-07-22 LAB — COPATH REPORT: NORMAL

## 2020-03-01 ENCOUNTER — HEALTH MAINTENANCE LETTER (OUTPATIENT)
Age: 35
End: 2020-03-01

## 2020-08-06 ENCOUNTER — VIRTUAL VISIT (OUTPATIENT)
Dept: URGENT CARE | Facility: CLINIC | Age: 35
End: 2020-08-06
Payer: COMMERCIAL

## 2020-08-06 DIAGNOSIS — R10.31 RLQ ABDOMINAL PAIN: Primary | ICD-10-CM

## 2020-08-06 PROCEDURE — 99213 OFFICE O/P EST LOW 20 MIN: CPT | Mod: TEL | Performed by: NURSE PRACTITIONER

## 2020-08-06 NOTE — PROGRESS NOTES
"Melissa Aguirre is a 35 year old female who is being evaluated via a billable telephone visit.       The patient has been notified of following:      \"This telephone visit will be conducted via a call between you and your physician/provider. We have found that certain health care needs can be provided without the need for a physical exam.  This service lets us provide the care you need with a short phone conversation.  If a prescription is necessary we can send it directly to your pharmacy.  If lab work is needed we can place an order for that and you can then stop by our lab to have the test done at a later time.     Telephone visits are billed at different rates depending on your insurance coverage. During this emergency period, for some insurers they may be billed the same as an in-person visit.  Please reach out to your insurance provider with any questions.     If during the course of the call the physician/provider feels a telephone visit is not appropriate, you will not be charged for this service.\"     Patient has given verbal consent for Telephone visit? Yes  What phone number would you like to be contacted at? [phone]    SUBJECTIVE  HPI:  Melissa Aguirre is a 35 year old female who presents with the CC of abdominal pain.    Pain is located in the lower right area, intermittent with radiation to None.  Notices more when move around, not tender  Started 12 hours ago.  The pain is characterized as sharp, and at worst is a level 3 on a scale of 1-10. Dull now.  LMP 2 weeks ago. Doesn't think pregnant  Has IUD 2 months ago  Denies nausea vomiting fever chills urinary symptoms hematuria diarrhea constipation   EXACERBATING FACTORS: movement/walking  RELIEVING FACTORS: nothing.  No known exposure, travel.   Still has appendix     Past Medical History:   Diagnosis Date     IUD (intrauterine device) in place 2010-4/12    actinomyces seen on pap 11/2012-pt asymptomatic      NO ACTIVE PROBLEMS      Current Outpatient " Medications   Medication Sig Dispense Refill     acetaminophen (TYLENOL) 325 MG tablet Take 2 tablets (650 mg) by mouth every 6 hours as needed for mild pain or fever (greater than or equal to 38  C /100.4  F (oral) or 38.5  C/ 101.4  F (core).) (Patient not taking: Reported on 7/15/2019)       ciclopirox (PENLAC) 8 % external solution Apply to adjacent skin and affected nails daily.  Remove with alcohol every 7 days, then repeat. 6.6 mL 3     ibuprofen (ADVIL/MOTRIN) 800 MG tablet Take 1 tablet (800 mg) by mouth every 8 hours as needed for moderate pain (mild-moderate pain) (Patient not taking: Reported on 7/15/2019)       Prenatal Vit-Fe Fumarate-FA (PRENATAL MULTIVITAMIN  PLUS IRON) 27-0.8 MG TABS Take 1 tablet by mouth daily 100 tablet 3     Social History     Tobacco Use     Smoking status: Never Smoker     Smokeless tobacco: Never Used   Substance Use Topics     Alcohol use: No     Alcohol/week: 0.0 standard drinks     Comment: none during pregnancy       ROS:  CONSTITUTIONAL:NEGATIVE for fever, chills, change in weight  INTEGUMENTARY/SKIN: NEGATIVE for worrisome rashes, moles or lesions  EYES: NEGATIVE for vision changes or irritation  ENT/MOUTH: NEGATIVE for ear, mouth and throat problems  RESP:NEGATIVE for significant cough or SOB  CV: NEGATIVE for chest pain, palpitations or peripheral edema  : negative for, dysuria and hematuria    OBJECTIVE:  GENERAL APPEARANCE: alert and no distress  RESP: lungs clear - no wheezes  CV: regular rates and rhythm, normal S1 S2  ABDOMEN:  soft, nontender  SKIN: no suspicious lesions or rashes    ASSESSMENT:    Hydrated nontoxic afebrile pain has only been 12 hours and improving  Will monitor symptoms, home treat  Emergent as reviewed to ER  Not improving will follow up with pcp    Telephone time spent 15 minutes    JUVENTINO Patiño CNP

## 2020-12-14 ENCOUNTER — HEALTH MAINTENANCE LETTER (OUTPATIENT)
Age: 35
End: 2020-12-14

## 2021-04-18 ENCOUNTER — HEALTH MAINTENANCE LETTER (OUTPATIENT)
Age: 36
End: 2021-04-18

## 2021-10-02 ENCOUNTER — HEALTH MAINTENANCE LETTER (OUTPATIENT)
Age: 36
End: 2021-10-02

## 2022-05-13 ENCOUNTER — OFFICE VISIT (OUTPATIENT)
Dept: URGENT CARE | Facility: URGENT CARE | Age: 37
End: 2022-05-13
Payer: COMMERCIAL

## 2022-05-13 VITALS
TEMPERATURE: 98.5 F | SYSTOLIC BLOOD PRESSURE: 110 MMHG | HEART RATE: 79 BPM | OXYGEN SATURATION: 98 % | DIASTOLIC BLOOD PRESSURE: 74 MMHG

## 2022-05-13 DIAGNOSIS — H10.11 ALLERGIC CONJUNCTIVITIS, RIGHT: Primary | ICD-10-CM

## 2022-05-13 PROCEDURE — 99213 OFFICE O/P EST LOW 20 MIN: CPT

## 2022-05-13 RX ORDER — OLOPATADINE HYDROCHLORIDE 1 MG/ML
1 SOLUTION/ DROPS OPHTHALMIC 2 TIMES DAILY
Qty: 5 ML | Refills: 0 | Status: SHIPPED | OUTPATIENT
Start: 2022-05-13 | End: 2024-02-13

## 2022-05-14 ENCOUNTER — HEALTH MAINTENANCE LETTER (OUTPATIENT)
Age: 37
End: 2022-05-14

## 2022-05-14 NOTE — PROGRESS NOTES
Assessment & Plan     Allergic conjunctivitis, right    - olopatadine (PATANOL) 0.1 % ophthalmic solution; Place 1 drop into both eyes 2 times daily      15 minutes spent on the date of the encounter doing patient visit and documentation        See Patient Instructions    Return in about 3 days (around 5/16/2022), or if symptoms worsen or fail to improve.    [facility] Urgent Care Provider  [facility] Torres is a 36 year old who presents for the following health issues     HPI     Melissa presents tonight with possible allergic conjunctivitis.  States she was at the park with her son when her eye became irritated with some thick clear and white discharge.  When she got home put some over-the-counter allergy eyedrops in her eye and her symptoms seem to get better.  Had her  look at her eye and he saw some possible fluid adhering to the conjunctiva.  Denies any change in her vision, no eye pain no photophobia.  No trauma to the eye when she was at the park.  Does have a history of allergies but never had anything like this happen to her.    Review of Systems   Constitutional, HEENT, cardiovascular, pulmonary, gi and gu systems are negative, except as otherwise noted.      Objective    /74 (BP Location: Right arm, Patient Position: Sitting, Cuff Size: Adult Regular)   Pulse 79   Temp 98.5  F (36.9  C) (Oral)   SpO2 98%   Breastfeeding No   There is no height or weight on file to calculate BMI.  Physical Exam   GENERAL: healthy, alert and no distress  EYES: PERRL, EOMI and conjunctiva/corneas- conjunctival injection OD  HENT: ear canals and TM's normal, nose and mouth without ulcers or lesions  NECK: no adenopathy  RESP: lungs clear to auscultation - no rales, rhonchi or wheezes

## 2022-09-01 ENCOUNTER — HOSPITAL ENCOUNTER (OUTPATIENT)
Dept: CT IMAGING | Facility: CLINIC | Age: 37
Discharge: HOME | End: 2022-09-01
Attending: OBSTETRICS & GYNECOLOGY | Admitting: OBSTETRICS & GYNECOLOGY
Payer: COMMERCIAL

## 2022-09-01 DIAGNOSIS — R10.2 PELVIC AND PERINEAL PAIN: ICD-10-CM

## 2022-09-01 PROCEDURE — 74177 CT ABD & PELVIS W/CONTRAST: CPT

## 2022-09-01 PROCEDURE — 250N000011 HC RX IP 250 OP 636: Performed by: OBSTETRICS & GYNECOLOGY

## 2022-09-01 PROCEDURE — 250N000009 HC RX 250: Performed by: OBSTETRICS & GYNECOLOGY

## 2022-09-01 RX ORDER — IOPAMIDOL 755 MG/ML
104 INJECTION, SOLUTION INTRAVASCULAR ONCE
Status: COMPLETED | OUTPATIENT
Start: 2022-09-01 | End: 2022-09-01

## 2022-09-01 RX ADMIN — IOPAMIDOL 104 ML: 755 INJECTION, SOLUTION INTRAVENOUS at 10:44

## 2022-09-01 RX ADMIN — SODIUM CHLORIDE 70 ML: 900 INJECTION INTRAVENOUS at 10:44

## 2022-09-03 ENCOUNTER — HEALTH MAINTENANCE LETTER (OUTPATIENT)
Age: 37
End: 2022-09-03

## 2022-10-03 ENCOUNTER — LAB REQUISITION (OUTPATIENT)
Dept: LAB | Facility: CLINIC | Age: 37
End: 2022-10-03

## 2022-10-03 DIAGNOSIS — R10.2 PELVIC AND PERINEAL PAIN: ICD-10-CM

## 2022-10-03 PROCEDURE — 87086 URINE CULTURE/COLONY COUNT: CPT | Performed by: OBSTETRICS & GYNECOLOGY

## 2022-10-05 LAB — BACTERIA UR CULT: NORMAL

## 2023-04-19 ENCOUNTER — OFFICE VISIT (OUTPATIENT)
Dept: URGENT CARE | Facility: URGENT CARE | Age: 38
End: 2023-04-19
Payer: COMMERCIAL

## 2023-04-19 VITALS
SYSTOLIC BLOOD PRESSURE: 134 MMHG | WEIGHT: 150 LBS | RESPIRATION RATE: 14 BRPM | DIASTOLIC BLOOD PRESSURE: 93 MMHG | OXYGEN SATURATION: 99 % | HEIGHT: 68 IN | TEMPERATURE: 97.3 F | HEART RATE: 91 BPM | BODY MASS INDEX: 22.73 KG/M2

## 2023-04-19 DIAGNOSIS — H92.01 RIGHT EAR PAIN: Primary | ICD-10-CM

## 2023-04-19 DIAGNOSIS — H69.91 DYSFUNCTION OF RIGHT EUSTACHIAN TUBE: ICD-10-CM

## 2023-04-19 PROCEDURE — 99213 OFFICE O/P EST LOW 20 MIN: CPT | Performed by: PHYSICIAN ASSISTANT

## 2023-04-19 RX ORDER — NAPROXEN 500 MG/1
500 TABLET ORAL 2 TIMES DAILY WITH MEALS
Qty: 20 TABLET | Refills: 0 | Status: SHIPPED | OUTPATIENT
Start: 2023-04-19 | End: 2024-02-13

## 2023-04-19 RX ORDER — CETIRIZINE HYDROCHLORIDE, PSEUDOEPHEDRINE HYDROCHLORIDE 5; 120 MG/1; MG/1
1 TABLET, FILM COATED, EXTENDED RELEASE ORAL 2 TIMES DAILY
Qty: 20 TABLET | Refills: 0 | Status: SHIPPED | OUTPATIENT
Start: 2023-04-19 | End: 2024-02-13

## 2023-04-19 NOTE — PROGRESS NOTES
"  Assessment & Plan     Right ear pain    Right ear exam normal, outer ear canal normal  Naprosyn for inflammation and pain  - naproxen (NAPROSYN) 500 MG tablet; Take 1 tablet (500 mg) by mouth 2 times daily (with meals)    Dysfunction of right eustachian tube    Patient has some mild ETD and pressure in ear  Zyrtec D for congestion and pressure  Follow up with PCP as needed    - cetirizine-pseudoePHEDrine ER (ZYRTEC-D) 5-120 MG 12 hr tablet; Take 1 tablet by mouth 2 times daily    Review of external notes as documented elsewhere in note       At today's visit with Melissa Aguirre , we discussed results, diagnosis, medications and formulated a plan.  We also discussed red flags for immediate return to clinic/ER, as well as indications for follow up with PCP if not improved in 3 days. Patient understood and agreed to plan. Melissa Aguirre was discharged with stable vitals and has no further questions.       No follow-ups on file.    Erasmo Mohan, [facility], PA-C  [facility] Torres is a 37 year old, presenting for the following health issues:  Urgent Care (Right ear pain x 2 days)         View : No data to display.              HPI   Review of Systems   Constitutional, HEENT, cardiovascular, pulmonary, gi and gu systems are negative, except as otherwise noted.      Objective    BP (!) 134/93   Pulse 91   Temp 97.3  F (36.3  C)   Resp 14   Ht 1.727 m (5' 8\")   Wt 68 kg (150 lb)   SpO2 99%   BMI 22.81 kg/m    Body mass index is 22.81 kg/m .  Physical Exam   GENERAL: healthy, alert and no distress  EYES: Eyes grossly normal to inspection, PERRL and conjunctivae and sclerae normal  HENT: ear canals and TM's normal, nose and mouth without ulcers or lesions  NECK: no adenopathy, no asymmetry, masses, or scars and thyroid normal to palpation  MS: no gross musculoskeletal defects noted, no edema  SKIN: no suspicious lesions or rashes  NEURO: Normal strength and tone, mentation intact and " speech normal  PSYCH: mentation appears normal, affect normal/bright        No results found for any visits on 04/19/23.

## 2023-06-03 ENCOUNTER — HEALTH MAINTENANCE LETTER (OUTPATIENT)
Age: 38
End: 2023-06-03

## 2023-08-18 ENCOUNTER — ANCILLARY PROCEDURE (OUTPATIENT)
Dept: MAMMOGRAPHY | Facility: CLINIC | Age: 38
End: 2023-08-18
Attending: OBSTETRICS & GYNECOLOGY
Payer: COMMERCIAL

## 2023-08-18 DIAGNOSIS — N63.10 UNSPECIFIED LUMP IN THE RIGHT BREAST, UNSPECIFIED QUADRANT: ICD-10-CM

## 2023-08-18 DIAGNOSIS — N63.10 LUMP OF RIGHT BREAST: ICD-10-CM

## 2023-08-18 PROCEDURE — 76642 ULTRASOUND BREAST LIMITED: CPT | Mod: RT

## 2023-08-18 PROCEDURE — G0279 TOMOSYNTHESIS, MAMMO: HCPCS

## 2023-08-18 PROCEDURE — 77066 DX MAMMO INCL CAD BI: CPT

## 2024-02-06 ENCOUNTER — MEDICAL CORRESPONDENCE (OUTPATIENT)
Dept: HEALTH INFORMATION MANAGEMENT | Facility: CLINIC | Age: 39
End: 2024-02-06
Payer: COMMERCIAL

## 2024-02-13 ENCOUNTER — OFFICE VISIT (OUTPATIENT)
Dept: SURGERY | Facility: CLINIC | Age: 39
End: 2024-02-13
Payer: COMMERCIAL

## 2024-02-13 VITALS
HEART RATE: 85 BPM | DIASTOLIC BLOOD PRESSURE: 80 MMHG | HEIGHT: 68 IN | WEIGHT: 150 LBS | SYSTOLIC BLOOD PRESSURE: 120 MMHG | BODY MASS INDEX: 22.73 KG/M2

## 2024-02-13 DIAGNOSIS — R10.31 RIGHT GROIN PAIN: Primary | ICD-10-CM

## 2024-02-13 PROCEDURE — 99244 OFF/OP CNSLTJ NEW/EST MOD 40: CPT | Performed by: SURGERY

## 2024-02-13 NOTE — LETTER
February 22, 2024          Beka Abad, DO  [address] [facility] [address]  [place] [address]      RE:   Melissa Aguirre 1985      Dear Colleague,    Thank you for referring your patient, Melissa Aguirre, to [facility] at [facility]. Please see a copy of my visit note below.    Melissa Aguirre MRN# [id number]   YOB: 1985 Age: 38 year old      PCP:  [facility] [phone]     Chief Complaint: Right groin pain, lower abdominal pain     History of Present Illness:  Melissa Aguirre is a 38 year old female who presented with a an area of pain and fullness in the right lower abdomen.  This has been present for some time.  Area of fullness is intermittent and gets worse throughout the course of the day.  Seems to be exacerbated by exercise and strenuous activity.  No history of surgery to this area.  No previous history of hernia repair.  Patient has not had imaging done of this area.  No obstructive type symptoms.  She is here for further discussion of possible hernia.      Assessment/plan:  Pleasant female with right lower abdominal pain.  Patient has not had bulging or reducible mass consistent with an obvious inguinal hernia.  Patient does admit to some intermittent constipation and abdominal discomfort.  I was not able to appreciate a mass in the typical location of a femoral or inguinal hernia.  For further workup of this, we did order a CT scan of the abdomen and pelvis.  I was not able to appreciate any fascial defects consistent with inguinal or femoral hernia.  If she continues to have discomfort associated with this, physical therapy may be the next option.  We will occasionally have to move forward with exploratory surgery to definitively identify any intra-abdominal hernias, but I feel this is a last resort.  In the meantime, I would like her to see neurology to address what appears to be a neuropathic, radiating pain.  We will help to get that set up  for her.    Again, thank you for allowing me to participate in the care of your patient.      Sincerely,      Arnold Reilly MD

## 2024-02-20 ENCOUNTER — HOSPITAL ENCOUNTER (OUTPATIENT)
Dept: CT IMAGING | Facility: CLINIC | Age: 39
Discharge: HOME | End: 2024-02-20
Attending: SURGERY | Admitting: SURGERY
Payer: COMMERCIAL

## 2024-02-20 DIAGNOSIS — R10.31 RIGHT GROIN PAIN: ICD-10-CM

## 2024-02-20 PROCEDURE — 250N000011 HC RX IP 250 OP 636: Performed by: SURGERY

## 2024-02-20 PROCEDURE — 74177 CT ABD & PELVIS W/CONTRAST: CPT

## 2024-02-20 PROCEDURE — 250N000009 HC RX 250: Performed by: SURGERY

## 2024-02-20 RX ORDER — IOPAMIDOL 755 MG/ML
75 INJECTION, SOLUTION INTRAVASCULAR ONCE
Status: COMPLETED | OUTPATIENT
Start: 2024-02-20 | End: 2024-02-20

## 2024-02-20 RX ADMIN — IOPAMIDOL 75 ML: 755 INJECTION, SOLUTION INTRAVENOUS at 17:21

## 2024-02-20 RX ADMIN — SODIUM CHLORIDE 62 ML: 9 INJECTION, SOLUTION INTRAVENOUS at 17:21

## 2024-02-21 DIAGNOSIS — R10.31 RIGHT GROIN PAIN: ICD-10-CM

## 2024-02-21 DIAGNOSIS — G62.9 NEUROPATHY: Primary | ICD-10-CM

## 2024-02-21 NOTE — PROGRESS NOTES
Surgery Consultation, [facility] [place]YENNE Reilly MD, MD    Melissa Aguirre MRN# [id number]   YOB: 1985 Age: 38 year old     PCP:  [facility]oni Salcido [facility] [phone]    Chief Complaint: Right groin pain, lower abdominal pain    History of Present Illness:  Melissa Aguirre is a 38 year old female who presented with a an area of pain and fullness in the right lower abdomen.  This has been present for some time.  Area of fullness is intermittent and gets worse throughout the course of the day.  Seems to be exacerbated by exercise and strenuous activity.  No history of surgery to this area.  No previous history of hernia repair.  Patient has not had imaging done of this area.  No obstructive type symptoms.  She is here for further discussion of possible hernia.    PMH:  Melissa Aguirre  has a past medical history of IUD (intrauterine device) in place (2010-4/12) and NO ACTIVE PROBLEMS.  PSH:  Melissa Aguirre  has a past surgical history that includes no history of surgery.    Home medications and allergies reviewed.    Social History:  Melissa Aguirre  reports that she has never smoked. She has never used smokeless tobacco. She reports current alcohol use. She reports that she does not use drugs.  Family History:  Melissa Aguirre family history includes Cardiovascular in her maternal grandmother; Depression in her mother; Diabetes in her maternal grandfather; Endometriosis in her mother; Eye Disorder in her father, maternal grandmother, mother, and another family member; Genitourinary Problems in an other family member; Gynecology in her mother and another family member; Hypertension in her father and maternal grandfather; Lipids in her father; Musculoskeletal Disorder in an other family member; Thyroid Disease in her maternal grandmother.    ROS:  The 10 point Review of Systems is negative other than noted in the HPI.    Physical Exam:  Blood pressure 120/80, pulse 85, height 1.727 m  "(5' 8\"), weight 68 kg (150 lb), not currently breastfeeding.  150 lbs 0 oz  Healthy appearing female in no distress.  Converses normally, affect unremarkable  Pupils equal round and reactive to light.  Moist mucous membranes, tongue midline   No cervical lymphadenopathy or thyromegaly.   Lung fields clear, breathing comfortably.   Heart normal sinus rhythm.  No murmurs rubs or gallops.  No obvious neurological deficits  Abdomen soft, nontender, nondistended.  No obvious asymmetry to the abdominal wall.  No obvious areas of fullness in the right inguinal area or below the inguinal ligament on either side.  Some vague discomfort to deep palpation on the right.       Assessment/plan:  Pleasant female with right lower abdominal pain.  Patient has not had bulging or reducible mass consistent with an obvious inguinal hernia.  Patient does admit to some intermittent constipation and abdominal discomfort.  I was not able to appreciate a mass in the typical location of a femoral or inguinal hernia.  For further workup of this, we did order a CT scan of the abdomen and pelvis.  I was not able to appreciate any fascial defects consistent with inguinal or femoral hernia.  If she continues to have discomfort associated with this, physical therapy may be the next option.  We will occasionally have to move forward with exploratory surgery to definitively identify any intra-abdominal hernias, but I feel this is a last resort.  In the meantime, I would like her to see neurology to address what appears to be a neuropathic, radiating pain.  We will help to get that set up for her.    Sanford Reilly M.D.  [facility]  [phone]    Please route or send letter to:  Primary Care Provider (PCP) and Referring Provider    "

## 2024-02-23 ENCOUNTER — TELEPHONE (OUTPATIENT)
Dept: SURGERY | Facility: CLINIC | Age: 39
End: 2024-02-23
Payer: COMMERCIAL

## 2024-02-23 NOTE — TELEPHONE ENCOUNTER
Patient saw MRG 2/13/24 for R groin pain and had a CT scan, she thought he was going to put in an order for an MRI within the [facility] network, but there is not one in her chart. She is wondering if she is confused about next steps    Please let patient know if this is correct and when order is placed so she can schedule. Or if she should just move on to seeing a neurologist, which there is a referral for.      Phone: [phone]   Message ok

## 2024-02-27 ENCOUNTER — TELEPHONE (OUTPATIENT)
Dept: NEUROLOGY | Facility: CLINIC | Age: 39
End: 2024-02-27
Payer: COMMERCIAL

## 2024-02-27 NOTE — TELEPHONE ENCOUNTER
ANDREY, sent MyC - Patient to call back an schedule another appointment with .    Radha Wolf on 2/27/2024 at 2:55 PM

## 2024-02-29 NOTE — TELEPHONE ENCOUNTER
Per Dr. Reilly, MRI not recommended. CT scan looked fine.  Could refer to physical therapy.    [employer] message sent to patient with this information    Paula Fung RN-BSN

## 2024-03-04 ENCOUNTER — MEDICAL CORRESPONDENCE (OUTPATIENT)
Dept: SCHEDULING | Facility: CLINIC | Age: 39
End: 2024-03-04
Payer: COMMERCIAL

## 2024-03-04 ENCOUNTER — TELEPHONE (OUTPATIENT)
Dept: NEUROLOGY | Facility: CLINIC | Age: 39
End: 2024-03-04
Payer: COMMERCIAL

## 2024-03-05 ENCOUNTER — ANCILLARY PROCEDURE (OUTPATIENT)
Dept: ULTRASOUND IMAGING | Facility: CLINIC | Age: 39
End: 2024-03-05
Attending: OBSTETRICS & GYNECOLOGY
Payer: COMMERCIAL

## 2024-03-05 DIAGNOSIS — R10.11 RIGHT UPPER QUADRANT PAIN: ICD-10-CM

## 2024-03-05 PROCEDURE — 76705 ECHO EXAM OF ABDOMEN: CPT

## 2024-04-28 ENCOUNTER — TRANSFERRED RECORDS (OUTPATIENT)
Dept: MULTI SPECIALTY CLINIC | Facility: CLINIC | Age: 39
End: 2024-04-28

## 2024-04-28 LAB — PAP SMEAR - HIM PATIENT REPORTED: NORMAL

## 2024-05-10 ENCOUNTER — ANCILLARY PROCEDURE (OUTPATIENT)
Dept: GENERAL RADIOLOGY | Facility: CLINIC | Age: 39
End: 2024-05-10
Attending: INTERNAL MEDICINE
Payer: COMMERCIAL

## 2024-05-10 DIAGNOSIS — R10.31 RLQ ABDOMINAL PAIN: ICD-10-CM

## 2024-05-10 PROCEDURE — 74019 RADEX ABDOMEN 2 VIEWS: CPT

## 2024-05-17 PROCEDURE — 88305 TISSUE EXAM BY PATHOLOGIST: CPT | Mod: 26 | Performed by: PATHOLOGY

## 2024-05-17 PROCEDURE — 88305 TISSUE EXAM BY PATHOLOGIST: CPT | Mod: TC,ORL | Performed by: OBSTETRICS & GYNECOLOGY

## 2024-05-20 ENCOUNTER — LAB REQUISITION (OUTPATIENT)
Dept: LAB | Facility: CLINIC | Age: 39
End: 2024-05-20
Payer: COMMERCIAL

## 2024-05-20 DIAGNOSIS — R10.2 PELVIC AND PERINEAL PAIN: ICD-10-CM

## 2024-05-22 LAB
PATH REPORT.COMMENTS IMP SPEC: NORMAL
PATH REPORT.COMMENTS IMP SPEC: NORMAL
PATH REPORT.FINAL DX SPEC: NORMAL
PATH REPORT.GROSS SPEC: NORMAL
PATH REPORT.MICROSCOPIC SPEC OTHER STN: NORMAL
PATH REPORT.RELEVANT HX SPEC: NORMAL
PHOTO IMAGE: NORMAL

## 2024-07-06 ENCOUNTER — HEALTH MAINTENANCE LETTER (OUTPATIENT)
Age: 39
End: 2024-07-06

## 2024-07-30 ENCOUNTER — LAB REQUISITION (OUTPATIENT)
Dept: LAB | Facility: CLINIC | Age: 39
End: 2024-07-30

## 2024-07-30 DIAGNOSIS — Z13.0 ENCOUNTER FOR SCREENING FOR DISEASES OF THE BLOOD AND BLOOD-FORMING ORGANS AND CERTAIN DISORDERS INVOLVING THE IMMUNE MECHANISM: ICD-10-CM

## 2024-07-30 LAB
BASOPHILS # BLD AUTO: 0.1 10E3/UL (ref 0–0.2)
BASOPHILS NFR BLD AUTO: 1 %
EOSINOPHIL # BLD AUTO: 0.3 10E3/UL (ref 0–0.7)
EOSINOPHIL NFR BLD AUTO: 4 %
ERYTHROCYTE [DISTWIDTH] IN BLOOD BY AUTOMATED COUNT: 11.9 % (ref 10–15)
FERRITIN SERPL-MCNC: 18 NG/ML (ref 6–175)
HCT VFR BLD AUTO: 41.5 % (ref 35–47)
HGB BLD-MCNC: 13.1 G/DL (ref 11.7–15.7)
IMM GRANULOCYTES # BLD: 0 10E3/UL
IMM GRANULOCYTES NFR BLD: 0 %
LYMPHOCYTES # BLD AUTO: 2.5 10E3/UL (ref 0.8–5.3)
LYMPHOCYTES NFR BLD AUTO: 36 %
MCH RBC QN AUTO: 28.1 PG (ref 26.5–33)
MCHC RBC AUTO-ENTMCNC: 31.6 G/DL (ref 31.5–36.5)
MCV RBC AUTO: 89 FL (ref 78–100)
MONOCYTES # BLD AUTO: 0.5 10E3/UL (ref 0–1.3)
MONOCYTES NFR BLD AUTO: 7 %
NEUTROPHILS # BLD AUTO: 3.7 10E3/UL (ref 1.6–8.3)
NEUTROPHILS NFR BLD AUTO: 52 %
NRBC # BLD AUTO: 0 10E3/UL
NRBC BLD AUTO-RTO: 0 /100
PLATELET # BLD AUTO: 306 10E3/UL (ref 150–450)
RBC # BLD AUTO: 4.66 10E6/UL (ref 3.8–5.2)
WBC # BLD AUTO: 7 10E3/UL (ref 4–11)

## 2024-07-30 PROCEDURE — 83540 ASSAY OF IRON: CPT | Performed by: OBSTETRICS & GYNECOLOGY

## 2024-07-30 PROCEDURE — 85004 AUTOMATED DIFF WBC COUNT: CPT | Performed by: OBSTETRICS & GYNECOLOGY

## 2024-07-30 PROCEDURE — 82728 ASSAY OF FERRITIN: CPT | Performed by: OBSTETRICS & GYNECOLOGY

## 2024-07-30 PROCEDURE — 84466 ASSAY OF TRANSFERRIN: CPT | Performed by: OBSTETRICS & GYNECOLOGY

## 2024-07-31 LAB
IRON SERPL-MCNC: 82 UG/DL (ref 37–145)
TRANSFERRIN SERPL-MCNC: 326 MG/DL (ref 200–360)

## 2024-09-23 ENCOUNTER — LAB REQUISITION (OUTPATIENT)
Dept: LAB | Facility: CLINIC | Age: 39
End: 2024-09-23

## 2024-09-23 DIAGNOSIS — Z13.9 ENCOUNTER FOR SCREENING, UNSPECIFIED: ICD-10-CM

## 2024-09-23 LAB
T4 FREE SERPL-MCNC: 1.23 NG/DL (ref 0.9–1.7)
TSH SERPL DL<=0.005 MIU/L-ACNC: 1.49 UIU/ML (ref 0.3–4.2)

## 2024-09-23 PROCEDURE — 84443 ASSAY THYROID STIM HORMONE: CPT | Performed by: OBSTETRICS & GYNECOLOGY

## 2024-09-23 PROCEDURE — 84439 ASSAY OF FREE THYROXINE: CPT | Performed by: OBSTETRICS & GYNECOLOGY

## 2024-10-06 ENCOUNTER — HOSPITAL ENCOUNTER (EMERGENCY)
Facility: CLINIC | Age: 39
Discharge: HOME | End: 2024-10-06
Attending: EMERGENCY MEDICINE | Admitting: EMERGENCY MEDICINE
Payer: COMMERCIAL

## 2024-10-06 VITALS
DIASTOLIC BLOOD PRESSURE: 86 MMHG | OXYGEN SATURATION: 99 % | TEMPERATURE: 97.9 F | HEIGHT: 68 IN | RESPIRATION RATE: 17 BRPM | HEART RATE: 80 BPM | WEIGHT: 160 LBS | BODY MASS INDEX: 24.25 KG/M2 | SYSTOLIC BLOOD PRESSURE: 140 MMHG

## 2024-10-06 DIAGNOSIS — F41.1 ANXIETY REACTION: ICD-10-CM

## 2024-10-06 LAB
ATRIAL RATE - MUSE: 108 BPM
DIASTOLIC BLOOD PRESSURE - MUSE: NORMAL MMHG
INTERPRETATION ECG - MUSE: NORMAL
P AXIS - MUSE: 72 DEGREES
PR INTERVAL - MUSE: 178 MS
QRS DURATION - MUSE: 60 MS
QT - MUSE: 318 MS
QTC - MUSE: 426 MS
R AXIS - MUSE: 67 DEGREES
SYSTOLIC BLOOD PRESSURE - MUSE: NORMAL MMHG
T AXIS - MUSE: 53 DEGREES
VENTRICULAR RATE- MUSE: 108 BPM

## 2024-10-06 PROCEDURE — 93005 ELECTROCARDIOGRAM TRACING: CPT | Mod: RTG

## 2024-10-06 PROCEDURE — 250N000013 HC RX MED GY IP 250 OP 250 PS 637: Performed by: EMERGENCY MEDICINE

## 2024-10-06 PROCEDURE — 99284 EMERGENCY DEPT VISIT MOD MDM: CPT

## 2024-10-06 RX ORDER — LORAZEPAM 0.5 MG/1
0.5 TABLET ORAL ONCE
Status: COMPLETED | OUTPATIENT
Start: 2024-10-06 | End: 2024-10-06

## 2024-10-06 RX ORDER — HYDROXYZINE HYDROCHLORIDE 25 MG/1
25 TABLET, FILM COATED ORAL 3 TIMES DAILY PRN
Qty: 15 TABLET | Refills: 0 | Status: SHIPPED | OUTPATIENT
Start: 2024-10-06

## 2024-10-06 RX ADMIN — LORAZEPAM 0.5 MG: 0.5 TABLET ORAL at 16:33

## 2024-10-06 ASSESSMENT — ACTIVITIES OF DAILY LIVING (ADL)
ADLS_ACUITY_SCORE: 37
ADLS_ACUITY_SCORE: 37

## 2024-10-06 ASSESSMENT — COLUMBIA-SUICIDE SEVERITY RATING SCALE - C-SSRS
6. HAVE YOU EVER DONE ANYTHING, STARTED TO DO ANYTHING, OR PREPARED TO DO ANYTHING TO END YOUR LIFE?: NO
2. HAVE YOU ACTUALLY HAD ANY THOUGHTS OF KILLING YOURSELF IN THE PAST MONTH?: NO
1. IN THE PAST MONTH, HAVE YOU WISHED YOU WERE DEAD OR WISHED YOU COULD GO TO SLEEP AND NOT WAKE UP?: NO

## 2024-10-06 NOTE — ED TRIAGE NOTES
Pt recently placed on birth control and was having mood swings. Pt got placed on Celexa yesterday for the symptoms. No hx of anxiety or depression. Pt was taking a nap today and was startled awake by her son and has not been able to calm down since. Pt feels chest tightness that feels like anxiety per pt, short of breath, and feels out of it. Panic attack started at 1515.     Triage Assessment (Adult)       Row Name 10/06/24 1606          Triage Assessment    Airway WDL WDL        Respiratory WDL    Respiratory WDL X;all     Rhythm/Pattern, Respiratory shallow;shortness of breath     Expansion/Accessory Muscles/Retractions expansion symmetric;no retractions        Skin Circulation/Temperature WDL    Skin Circulation/Temperature WDL WDL        Cardiac WDL    Cardiac WDL X  chest tightness        Peripheral/Neurovascular WDL    Peripheral Neurovascular WDL WDL        Cognitive/Neuro/Behavioral WDL    Cognitive/Neuro/Behavioral WDL X;mood/behavior     Level of Consciousness alert     Arousal Level opens eyes spontaneously     Orientation oriented x 4     Speech clear;spontaneous;logical     Mood/Behavior anxious        [place] Coma Scale    Best Eye Response 4-->(E4) spontaneous     Best Motor Response 6-->(M6) obeys commands     Best Verbal Response 5-->(V5) oriented     Leonel Coma Scale Score 15

## 2024-10-06 NOTE — ED PROVIDER NOTES
"  Emergency Department Note      History of Present Illness     Chief Complaint   Panic Attack      HPI   Melissa Aguirre is a 39 year old female who presents emergency department with shortness of breath tachycardia disorientation and anxiety after she was awoken from a nap by her son.  She did to start Celexa 2 days ago.  She is having a difficult time settling down from this.  No physical complaints otherwise.  She was slightly tachycardic on arrival.  She does not have any chest pain.  No nausea or vomiting.  No infectious symptoms.  No other concerns at this time.    Independent Historian   None    Review of External Notes   I reviewed her office visit on September 18.    Past Medical History     Medical History and Problem List   Past Medical History:   Diagnosis Date    IUD (intrauterine device) in place 2010-4/12    NO ACTIVE PROBLEMS        Medications   hydrOXYzine HCl (ATARAX) 25 MG tablet  TRANEXAMIC ACID PO        Surgical History   Past Surgical History:   Procedure Laterality Date    NO HISTORY OF SURGERY         Physical Exam     Patient Vitals for the past 24 hrs:   BP Temp Temp src Pulse Resp SpO2 Height Weight   10/06/24 1715 -- -- -- -- -- 100 % -- --   10/06/24 1700 -- -- -- -- -- 100 % -- --   10/06/24 1645 -- -- -- -- -- 99 % -- --   10/06/24 1606 -- -- -- 118 -- 99 % -- --   10/06/24 1605 (!) 163/95 97.9  F (36.6  C) Temporal (!) 130 17 100 % 1.727 m (5' 8\") 72.6 kg (160 lb)     Physical Exam  Constitutional: Vital signs reviewed.  Pleasant.  HEENT: Moist mucous membranes  Cardiovascular: Tachycardic rate and rhythm  Pulmonary/Chest: Breathing comfortably on room air.  No audible wheezing  Musculoskeletal/Extremities: No bony deformities.  Moves all 4 extremities without difficulty.  Neurological: Alert.  No focal deficits.  Endo: No pitting edema  Skin: No visible rash.  Psychiatric: Pleasant.  Slightly anxious      Diagnostics     Lab Results   Labs Ordered and Resulted from Time of ED Arrival " to Time of ED Departure - No data to display    Imaging   No orders to display       EKG   ECG results from 10/06/24   EKG 12 lead     Value    Systolic Blood Pressure     Diastolic Blood Pressure     Ventricular Rate 108    Atrial Rate 108    CO Interval 178    QRS Duration 60        QTc 426    P Axis 72    R AXIS 67    T Axis 53    Interpretation ECG      Sinus tachycardia  Possible Left atrial enlargement  Septal infarct , age undetermined  Abnormal ECG  No previous ECGs available  Confirmed by GENERATED REPORT, COMPUTER ([id number]),  Chrissy Randhawa ([id number]) on 10/6/2024 5:25:38 PM           Independent Interpretation   X-ray today shows sinus tachycardia, rate of 108, no acute concerning ST or T wave changes    ED Course      Medications Administered   Medications   LORazepam (ATIVAN) tablet 0.5 mg (0.5 mg Oral $Given 10/6/24 9439)       Procedures   Procedures     Discussion of Management   None    ED Course        Additional Documentation  None    Medical Decision Making / Diagnosis     CMS Diagnoses: None    MIPS       None    MDM   Melissa Aguirre is a 39 year old female who presents emerged department with concerns over anxiety type reaction.  She did restart Celexa 2 days ago and is wondering if that may have played a role.  She was particular concerned about the tachycardia that was associated with this.  Fortune her EKG here, while it does show tachycardia, does not show any signs of ischemia or heart strain.  There is no chest pain or pleuritic chest pain I doubt this represents acute cord syndrome or PE.  She was given half a milligram of Ativan and she is feeling much better.  Her plan is to discontinue the Celexa which I think is reasonable.  As we discussed she does not need to wean off this as she is only taken 2 doses.  I did agree to give her some hydroxyzine at home in case she has another reaction like this.  While she and her  are comfortable plan is to be discharged  home.    Disposition   The patient was discharged.     Diagnosis     ICD-10-CM    1. Anxiety reaction  F41.1            Discharge Medications   New Prescriptions    HYDROXYZINE HCL (ATARAX) 25 MG TABLET    Take 1 tablet (25 mg) by mouth 3 times daily as needed for anxiety.         MD Ale Gibson, Francis Rowell MD  10/06/24 1722

## 2024-12-06 ENCOUNTER — MYC MEDICAL ADVICE (OUTPATIENT)
Dept: FAMILY MEDICINE | Facility: CLINIC | Age: 39
End: 2024-12-06

## 2024-12-06 ENCOUNTER — MEDICAL CORRESPONDENCE (OUTPATIENT)
Dept: HEALTH INFORMATION MANAGEMENT | Facility: CLINIC | Age: 39
End: 2024-12-06

## 2024-12-09 ENCOUNTER — TELEPHONE (OUTPATIENT)
Dept: FAMILY MEDICINE | Facility: CLINIC | Age: 39
End: 2024-12-09
Payer: COMMERCIAL

## 2024-12-09 NOTE — TELEPHONE ENCOUNTER
See the 12-9-24 Tel Enc stating the [facility] Form and 2 [facility] referrals were faxed directly to [phone], attention Peg.     There is another MY CHART encounter 12-6-24 above this one regarding same.     Both referrals are for [facility].  I do not see that [facility] is involved at this time? Await visit completion at the clinic portion of  [facility] if  hospital referral will be needed?     Lima TIM MA

## 2024-12-09 NOTE — TELEPHONE ENCOUNTER
See 12-6-24 MY CHART encounter with added message from pt today 12-9-24 to fax the form (Managed Care) & [facility] referrals  directly to [phone], attention Peg.     Re-faxed.      Lima TIM MA

## 2025-04-13 ENCOUNTER — OFFICE VISIT (OUTPATIENT)
Dept: URGENT CARE | Facility: URGENT CARE | Age: 40
End: 2025-04-13
Payer: COMMERCIAL

## 2025-04-13 VITALS
BODY MASS INDEX: 24.4 KG/M2 | RESPIRATION RATE: 18 BRPM | TEMPERATURE: 98.6 F | OXYGEN SATURATION: 100 % | SYSTOLIC BLOOD PRESSURE: 135 MMHG | HEIGHT: 68 IN | HEART RATE: 80 BPM | WEIGHT: 161 LBS | DIASTOLIC BLOOD PRESSURE: 86 MMHG

## 2025-04-13 DIAGNOSIS — J01.90 ACUTE SINUSITIS WITH COEXISTING CONDITION REQUIRING PROPHYLACTIC TREATMENT: Primary | ICD-10-CM

## 2025-04-13 PROCEDURE — 99213 OFFICE O/P EST LOW 20 MIN: CPT | Performed by: FAMILY MEDICINE

## 2025-04-13 RX ORDER — DOXYCYCLINE 100 MG/1
100 TABLET ORAL 2 TIMES DAILY
Qty: 14 TABLET | Refills: 0 | Status: SHIPPED | OUTPATIENT
Start: 2025-04-13 | End: 2025-04-20

## 2025-04-13 NOTE — PROGRESS NOTES
"SUBJECTIVE: Melissa Aguirre is a 39 year old female here with concerns about sinus infection.  She states onset  of symptoms were greater than 10 days with sinus pressure and drainage.  Course of illness is worsening.    Severity: moderate  Current and Associated symptoms: cold symptoms  Predisposing factors include none.   Recent treatment has included: OTC's      Past Medical History:   Diagnosis Date    IUD (intrauterine device) in place 2010-4/12    actinomyces seen on pap 11/2012-pt asymptomatic     NO ACTIVE PROBLEMS      Allergies   Allergen Reactions    Amoxicillin Swelling and Rash    Penicillins Swelling and Rash     Social History     Tobacco Use    Smoking status: Never     Passive exposure: Never    Smokeless tobacco: Never   Substance Use Topics    Alcohol use: Yes     Comment: 5-7 per week       ROS:   no rash  no vomiting    OBJECTIVE:  /86 (BP Location: Left arm, Patient Position: Sitting, Cuff Size: Adult Regular)   Pulse 80   Temp 98.6  F (37  C) (Tympanic)   Resp 18   Ht 1.727 m (5' 8\")   Wt 73 kg (161 lb)   LMP 04/05/2025 (Exact Date)   SpO2 100%   BMI 24.48 kg/m    NAD  EYES: clear, no mattering  EARS: TM's clear, no redness/buldging, canals clear, no swelling/redness  NOSE: discolored discharge viola. Nares  THROAT: clear, no erythema, no exudate  SINUS: maxillary tenderness with palpation  LUNGS: CTAB, no rhonchi/wheeze/rales      ICD-10-CM    1. Acute sinusitis with coexisting condition requiring prophylactic treatment  J01.90 doxycycline monohydrate (ADOXA) 100 MG tablet          Follow up with primary clinic if not improving    "

## 2025-04-13 NOTE — PROGRESS NOTES
Urgent Care Clinic Visit    Chief Complaint   Patient presents with    Sinus Problem     Patient here with concern of sinus infection. Pressure behind eyes, post nasal drip, ears feel full, headaches. Symptoms started a week ago.                4/13/2025     5:33 PM   Additional Questions   Roomed by Mariya Lopez MA   Accompanied by self         4/13/2025     5:33 PM   Patient Reported Additional Medications   Patient reports taking the following new medications No new meds. No current meds except for OTC allergy meds.

## 2025-04-25 ENCOUNTER — LAB REQUISITION (OUTPATIENT)
Dept: LAB | Facility: CLINIC | Age: 40
End: 2025-04-25

## 2025-04-25 DIAGNOSIS — R03.0 ELEVATED BLOOD-PRESSURE READING, WITHOUT DIAGNOSIS OF HYPERTENSION: ICD-10-CM

## 2025-04-25 PROCEDURE — 80048 BASIC METABOLIC PNL TOTAL CA: CPT | Performed by: OBSTETRICS & GYNECOLOGY

## 2025-04-26 LAB
ANION GAP SERPL CALCULATED.3IONS-SCNC: 12 MMOL/L (ref 7–15)
BUN SERPL-MCNC: 15.5 MG/DL (ref 6–20)
CALCIUM SERPL-MCNC: 9.1 MG/DL (ref 8.8–10.4)
CHLORIDE SERPL-SCNC: 101 MMOL/L (ref 98–107)
CREAT SERPL-MCNC: 0.73 MG/DL (ref 0.51–0.95)
EGFRCR SERPLBLD CKD-EPI 2021: >90 ML/MIN/1.73M2
GLUCOSE SERPL-MCNC: 112 MG/DL (ref 70–99)
HCO3 SERPL-SCNC: 24 MMOL/L (ref 22–29)
POTASSIUM SERPL-SCNC: 3.5 MMOL/L (ref 3.4–5.3)
SODIUM SERPL-SCNC: 137 MMOL/L (ref 135–145)

## 2025-05-01 ENCOUNTER — OFFICE VISIT (OUTPATIENT)
Dept: FAMILY MEDICINE | Facility: CLINIC | Age: 40
End: 2025-05-01
Payer: COMMERCIAL

## 2025-05-01 VITALS
DIASTOLIC BLOOD PRESSURE: 78 MMHG | WEIGHT: 161.8 LBS | HEART RATE: 108 BPM | BODY MASS INDEX: 24.52 KG/M2 | SYSTOLIC BLOOD PRESSURE: 123 MMHG | HEIGHT: 68 IN | OXYGEN SATURATION: 98 % | RESPIRATION RATE: 18 BRPM | TEMPERATURE: 99 F

## 2025-05-01 DIAGNOSIS — Z87.42 HISTORY OF ENDOMETRIOSIS: ICD-10-CM

## 2025-05-01 DIAGNOSIS — Z86.39 HISTORY OF IRON DEFICIENCY: ICD-10-CM

## 2025-05-01 DIAGNOSIS — F41.9 ANXIETY: Primary | ICD-10-CM

## 2025-05-01 DIAGNOSIS — R03.0 ELEVATED BLOOD PRESSURE READING WITHOUT DIAGNOSIS OF HYPERTENSION: ICD-10-CM

## 2025-05-01 DIAGNOSIS — R00.0 TACHYCARDIA: ICD-10-CM

## 2025-05-01 ASSESSMENT — PAIN SCALES - GENERAL: PAINLEVEL_OUTOF10: NO PAIN (0)

## 2025-05-01 NOTE — PROGRESS NOTES
Assessment & Plan   Problem List Items Addressed This Visit    None  Visit Diagnoses       Anxiety    -  Primary    History of endometriosis        History of iron deficiency        Tachycardia        Elevated blood pressure reading without diagnosis of hypertension            Reassurance given that probably her blood pressure elevation is triggered by anxiety, discussed importance of yoga and meditation and/or relaxation to help with her blood pressure.  Her blood pressure dropped down in the clinic down to 123 from the high 130s range.  She was tachycardic throughout her visit in the 90s.  She was prescribed propranolol and I encouraged her to take up to 10 mg 3 times daily as needed for anxiety palpitations tachycardia elevated blood pressure, a lot of reassurance given, continue with exercise and healthy diet and lifestyle changes.  Continue monitor blood pressure closely as outpatient.  May consider doing 24-hour blood pressure monitor if needed.  Kidney function is normal.  She does have some iron deficiency advised to take some iron supplements such as iron glycinate.  No family history of hypertension in a young age.  No known sudden cardiac death at young age or history of ischemic heart disease at young age.  Heart auscultation is normal, no murmurs S3 or S4.  Pulses were equal and symmetric.  Continue closely monitor as outpatient follow-up as needed.  Patient discharged from clinic in stable condition.  Total time spent was 40 minutes review of records addressing multiple health concerns review of labs and documentation.         Trey Torres is a 39 year old, presenting for the following health issues:  Hypertension        5/1/2025     3:16 PM   Additional Questions   Roomed by Eileen   Accompanied by Not applicable, by themselves     Via the Health Maintenance questionnaire, the patient has reported the following services have been completed -Cervical Cancer Screening: Xenia mcmanus  "2024-04-28, this information has been sent to the abstraction team.  History of Present Illness       Reason for visit:  High blood pressure  Symptom onset:  More than a month  Symptom intensity:  Moderate  Symptom progression:  Worsening  Had these symptoms before:  Yes  What makes it worse:  Anxiety   She is taking medications regularly.      Patient presenting for concerns of possible hypertension, she follows with GYN.  History of migraine headaches mainly right-sided, she has TMJ syndrome also right-sided.  Denies any aura.  She was on oral contraceptives and her blood pressure increased secondarily back in September.  Also has some eye pressure she sees eye doctor has chronic dry eyes she underwent LASIK.  She went off oral contraceptive and her blood pressure has decreased since December.  But now has been going up again.  She follows [facility] for endometriosis.  She has history of anxiety with racing hearts at times, and foggy brain sweating at times, she was on Celexa that caused her to be panicky.      Review of Systems  Constitutional, HEENT, cardiovascular, pulmonary, gi and gu systems are negative, except as otherwise noted.      Objective    /78   Pulse 108   Temp 99  F (37.2  C) (Oral)   Resp 18   Ht 1.727 m (5' 8\")   Wt 73.4 kg (161 lb 12.8 oz)   LMP 04/05/2025 (Exact Date)   SpO2 98%   BMI 24.60 kg/m    Body mass index is 24.6 kg/m .  Physical Exam   GENERAL: alert and no distress  EYES: Eyes grossly normal to inspection, PERRL and conjunctivae and sclerae normal  HENT: ear canals and TM's normal, nose and mouth without ulcers or lesions  NECK: no adenopathy, no asymmetry, masses, or scars  RESP: lungs clear to auscultation - no rales, rhonchi or wheezes  CV: regular rate and rhythm, normal S1 S2, no S3 or S4, no murmur, click or rub, no peripheral edema  ABDOMEN: soft, nontender, no hepatosplenomegaly, no masses and bowel sounds normal  MS: no gross musculoskeletal defects noted, " no edema  SKIN: no suspicious lesions or rashes  NEURO: Normal strength and tone, mentation intact and speech normal  PSYCH: mentation appears normal, affect normal/bright    Lab Requisition on 04/25/2025   Component Date Value Ref Range Status    Sodium 04/25/2025 137  135 - 145 mmol/L Final    Potassium 04/25/2025 3.5  3.4 - 5.3 mmol/L Final    Chloride 04/25/2025 101  98 - 107 mmol/L Final    Carbon Dioxide (CO2) 04/25/2025 24  22 - 29 mmol/L Final    Anion Gap 04/25/2025 12  7 - 15 mmol/L Final    Urea Nitrogen 04/25/2025 15.5  6.0 - 20.0 mg/dL Final    Creatinine 04/25/2025 0.73  0.51 - 0.95 mg/dL Final    GFR Estimate 04/25/2025 >90  >60 mL/min/1.73m2 Final    eGFR calculated using 2021 CKD-EPI equation.    Calcium 04/25/2025 9.1  8.8 - 10.4 mg/dL Final    Glucose 04/25/2025 112 (H)  70 - 99 mg/dL Final           Signed Electronically by: Gautam Urbina MD

## 2025-05-22 ENCOUNTER — PATIENT OUTREACH (OUTPATIENT)
Dept: CARE COORDINATION | Facility: CLINIC | Age: 40
End: 2025-05-22
Payer: COMMERCIAL

## 2025-06-17 ENCOUNTER — OFFICE VISIT (OUTPATIENT)
Dept: FAMILY MEDICINE | Facility: CLINIC | Age: 40
End: 2025-06-17
Payer: COMMERCIAL

## 2025-06-17 VITALS
OXYGEN SATURATION: 97 % | TEMPERATURE: 97.7 F | SYSTOLIC BLOOD PRESSURE: 125 MMHG | HEART RATE: 97 BPM | WEIGHT: 162 LBS | RESPIRATION RATE: 16 BRPM | HEIGHT: 68 IN | BODY MASS INDEX: 24.55 KG/M2 | DIASTOLIC BLOOD PRESSURE: 70 MMHG

## 2025-06-17 DIAGNOSIS — Z13.6 CARDIOVASCULAR SCREENING; LDL GOAL LESS THAN 130: ICD-10-CM

## 2025-06-17 DIAGNOSIS — Z11.59 ENCOUNTER FOR HEPATITIS C SCREENING TEST FOR LOW RISK PATIENT: ICD-10-CM

## 2025-06-17 DIAGNOSIS — F41.9 ANXIETY: ICD-10-CM

## 2025-06-17 DIAGNOSIS — G89.29 CHRONIC PELVIC PAIN IN FEMALE: ICD-10-CM

## 2025-06-17 DIAGNOSIS — Z86.39 HISTORY OF IRON DEFICIENCY: ICD-10-CM

## 2025-06-17 DIAGNOSIS — Z00.00 ANNUAL PHYSICAL EXAM: Primary | ICD-10-CM

## 2025-06-17 DIAGNOSIS — R10.2 CHRONIC PELVIC PAIN IN FEMALE: ICD-10-CM

## 2025-06-17 DIAGNOSIS — R05.1 ACUTE COUGH: ICD-10-CM

## 2025-06-17 DIAGNOSIS — M26.609 TMJ (TEMPOROMANDIBULAR JOINT SYNDROME): ICD-10-CM

## 2025-06-17 DIAGNOSIS — Z87.42 HISTORY OF ENDOMETRIOSIS: ICD-10-CM

## 2025-06-17 DIAGNOSIS — R03.0 ELEVATED BLOOD PRESSURE READING WITHOUT DIAGNOSIS OF HYPERTENSION: ICD-10-CM

## 2025-06-17 LAB
ALBUMIN SERPL BCG-MCNC: 4.5 G/DL (ref 3.5–5.2)
ALP SERPL-CCNC: 67 U/L (ref 40–150)
ALT SERPL W P-5'-P-CCNC: 17 U/L (ref 0–50)
ANION GAP SERPL CALCULATED.3IONS-SCNC: 10 MMOL/L (ref 7–15)
AST SERPL W P-5'-P-CCNC: 19 U/L (ref 0–45)
BASOPHILS # BLD AUTO: 0 10E3/UL (ref 0–0.2)
BASOPHILS NFR BLD AUTO: 1 %
BILIRUB SERPL-MCNC: 0.3 MG/DL
BUN SERPL-MCNC: 13.7 MG/DL (ref 6–20)
CALCIUM SERPL-MCNC: 9.2 MG/DL (ref 8.8–10.4)
CHLORIDE SERPL-SCNC: 103 MMOL/L (ref 98–107)
CHOLEST SERPL-MCNC: 160 MG/DL
CREAT SERPL-MCNC: 0.78 MG/DL (ref 0.51–0.95)
EGFRCR SERPLBLD CKD-EPI 2021: >90 ML/MIN/1.73M2
EOSINOPHIL # BLD AUTO: 0.3 10E3/UL (ref 0–0.7)
EOSINOPHIL NFR BLD AUTO: 5 %
ERYTHROCYTE [DISTWIDTH] IN BLOOD BY AUTOMATED COUNT: 13.1 % (ref 10–15)
FASTING STATUS PATIENT QL REPORTED: YES
FASTING STATUS PATIENT QL REPORTED: YES
FERRITIN SERPL-MCNC: 16 NG/ML (ref 6–175)
GLUCOSE SERPL-MCNC: 107 MG/DL (ref 70–99)
HCO3 SERPL-SCNC: 23 MMOL/L (ref 22–29)
HCT VFR BLD AUTO: 39.4 % (ref 35–47)
HDLC SERPL-MCNC: 56 MG/DL
HGB BLD-MCNC: 12.5 G/DL (ref 11.7–15.7)
IMM GRANULOCYTES # BLD: 0 10E3/UL
IMM GRANULOCYTES NFR BLD: 0 %
IRON BINDING CAPACITY (ROCHE): 411 UG/DL (ref 240–430)
IRON SATN MFR SERPL: 25 % (ref 15–46)
IRON SERPL-MCNC: 104 UG/DL (ref 37–145)
LDLC SERPL CALC-MCNC: 96 MG/DL
LYMPHOCYTES # BLD AUTO: 1.9 10E3/UL (ref 0.8–5.3)
LYMPHOCYTES NFR BLD AUTO: 31 %
MCH RBC QN AUTO: 26.7 PG (ref 26.5–33)
MCHC RBC AUTO-ENTMCNC: 31.7 G/DL (ref 31.5–36.5)
MCV RBC AUTO: 84 FL (ref 78–100)
MONOCYTES # BLD AUTO: 0.6 10E3/UL (ref 0–1.3)
MONOCYTES NFR BLD AUTO: 9 %
NEUTROPHILS # BLD AUTO: 3.3 10E3/UL (ref 1.6–8.3)
NEUTROPHILS NFR BLD AUTO: 54 %
NONHDLC SERPL-MCNC: 104 MG/DL
PLATELET # BLD AUTO: 341 10E3/UL (ref 150–450)
POTASSIUM SERPL-SCNC: 4.9 MMOL/L (ref 3.4–5.3)
PROT SERPL-MCNC: 7.8 G/DL (ref 6.4–8.3)
RBC # BLD AUTO: 4.69 10E6/UL (ref 3.8–5.2)
SODIUM SERPL-SCNC: 136 MMOL/L (ref 135–145)
TRIGL SERPL-MCNC: 41 MG/DL
WBC # BLD AUTO: 6.1 10E3/UL (ref 4–11)

## 2025-06-17 RX ORDER — AZITHROMYCIN 250 MG/1
TABLET, FILM COATED ORAL
Qty: 6 TABLET | Refills: 0 | Status: SHIPPED | OUTPATIENT
Start: 2025-06-17 | End: 2025-06-22

## 2025-06-17 RX ORDER — PROPRANOLOL HYDROCHLORIDE 10 MG/1
1 TABLET ORAL
COMMUNITY
Start: 2024-12-03

## 2025-06-17 RX ORDER — PROPRANOLOL HCL 20 MG
1 TABLET ORAL
COMMUNITY
Start: 2025-04-30 | End: 2025-06-17

## 2025-06-17 SDOH — HEALTH STABILITY: PHYSICAL HEALTH: ON AVERAGE, HOW MANY DAYS PER WEEK DO YOU ENGAGE IN MODERATE TO STRENUOUS EXERCISE (LIKE A BRISK WALK)?: 4 DAYS

## 2025-06-17 SDOH — HEALTH STABILITY: PHYSICAL HEALTH: ON AVERAGE, HOW MANY MINUTES DO YOU ENGAGE IN EXERCISE AT THIS LEVEL?: 50 MIN

## 2025-06-17 ASSESSMENT — SOCIAL DETERMINANTS OF HEALTH (SDOH): HOW OFTEN DO YOU GET TOGETHER WITH FRIENDS OR RELATIVES?: TWICE A WEEK

## 2025-06-17 ASSESSMENT — PAIN SCALES - GENERAL: PAINLEVEL_OUTOF10: NO PAIN (0)

## 2025-06-17 NOTE — PATIENT INSTRUCTIONS
-Effexor (SNRI) or Lexapro (selective serotonin reuptake inhibitor - similar to Celexa).   -ZPAK to pharmacy   Labs today

## 2025-06-17 NOTE — PROGRESS NOTES
Preventive Care Visit  [facility] GERARD Felix PA-C, Physician Assistant - Medical  Jun 17, 2025      Assessment & Plan     Annual physical exam  Annual labs ordered. Healthy diet and exercise reviewed. Recommended routine dental, eye, and skin screenings.  Begin mammogram screening at age 41.  Completed colonoscopy at age 37 will be due again until age 47.    - CBC with Platelets & Differential  - Comprehensive metabolic panel  - Lipid panel reflex to direct LDL Fasting  - Iron and iron binding capacity  - Ferritin    CARDIOVASCULAR SCREENING; LDL GOAL LESS THAN 130  Lipid panel ordered  - Lipid panel reflex to direct LDL Fasting    Anxiety  Reviewed different medication options including SNRIs, SSRIs.  However given previous intolerance it seems like to Celexa she is hesitant to begin an SSRI again which is reasonable.  She will keep posted on this via [employer] message. If we were to start these medications I would have her follow-up in 4 to 6 weeks after starting.  Continue propranolol as needed.  Focus on getting good sleep, exercise, staying well-hydrated.    Elevated blood pressure reading without diagnosis of hypertension  Significantly improved today.  Suspect related to anxiety.  Healthy diet and exercise.  Blood pressure monitoring on occasion however reassured by readings in office likely stress related.    Acute cough  Reasonable to pursue antibiotic therapy given length of time with cough.  Z-Ion sent to pharmacy.  - azithromycin (ZITHROMAX) 250 MG tablet  Dispense: 6 tablet; Refill: 0    TMJ (temporomandibular joint syndrome)  Headaches  Continue work with TMJ specialist.  Seemingly improving.  Reassured that she had seen the eye doctor.  Close follow-up with development of new or worsening headaches.  Did I believe head imaging is indicated at this time.  No red flag symptoms.  SSRI/SNRI as noted above may help these as well.    History of endometriosis  Chronic pelvic pain  in female  Continue follow-up with OB/GYN    History of iron deficiency  Iron studies today.  - Iron and iron binding capacity  - Ferritin    Patient has been advised of split billing requirements and indicates understanding: Yes    Counseling  Appropriate preventive services were addressed with this patient via screening, questionnaire, or discussion as appropriate for fall prevention, nutrition, physical activity, Tobacco-use cessation, social engagement, weight loss and cognition.  Checklist reviewing preventive services available has been given to the patient.  Reviewed patient's diet, addressing concerns and/or questions.   She is at risk for psychosocial distress and has been provided with information to reduce risk.     Subjective   Melissa is a pleasant 40 year old, presenting for the following:  Physical    Here today for annual physical.  We had met originally at a virtual visit back in December where she needed a referral to [facility].  Works as a  at [employer].   with 2 kids.    -Following closely with her OB/GYN Dr. Abad as well as [facility].  History of endometriosis/chronic pelvic pain.    -Has had some elevated blood pressure readings as well as anxiety.  BP significantly improved following end of the school year.  125/70 and 126/82 today in office.  BP was higher when on hormones last year.  Stays very active with working out at [employer] since January.  Enjoys spin classes.  Propranolol as needed for anxiety.  Did try Celexa and noted feeling off/like a zombie while on this.  Was on hormones at that time as well.  Interested in potentially pursuing additional daily option.    - Working closely with a TMJ specialist.  Was experiencing migraines which have improved since her TMJ is improving.  Wearing a mouthguard.  Headaches often one-sided and behind it hide.  Did see her eye doctor for routine eye exam.  Does take ibuprofen as well as Excedrin as needed which are  mostly ineffective.  Denies numbness/tingling/weakness.  Has never seen a neurologist.  Headaches/migraines since her 20s.  No head imaging completed as of yet.    -Productive cough x 3 weeks with sinus congestion/pressure.  No fever, chills, sweats.  No shortness of breath or chest pain.    -History of iron deficiency.  Takes iron every other day    Advance Care Planning    Discussed advance care planning with patient; informed AVS has link to [employer].        6/17/2025   General Health   How would you rate your overall physical health? Good   Feel stress (tense, anxious, or unable to sleep) Rather much   (!) STRESS CONCERN      6/17/2025   Nutrition   Three or more servings of calcium each day? Yes   Diet: Regular (no restrictions)   How many servings of fruit and vegetables per day? (!) 2-3   How many sweetened beverages each day? 0-1         6/17/2025   Exercise   Days per week of moderate/strenous exercise 4 days   Average minutes spent exercising at this level 50 min         6/17/2025   Social Factors   Frequency of gathering with friends or relatives Twice a week   Worry food won't last until get money to buy more No   Food not last or not have enough money for food? No   Do you have housing? (Housing is defined as stable permanent housing and does not include staying outside in a car, in a tent, in an abandoned building, in an overnight shelter, or couch-surfing.) Yes   Are you worried about losing your housing? No   Lack of transportation? No   Unable to get utilities (heat,electricity)? No         6/17/2025   Dental   Dentist two times every year? Yes           Today's PHQ-2 Score:       6/17/2025     7:25 AM   PHQ-2 ( 1999 [employer])   Q1: Little interest or pleasure in doing things 0   Q2: Feeling down, depressed or hopeless 0   PHQ-2 Score 0         6/17/2025   Substance Use   Alcohol more than 3/day or more than 7/wk No   Do you use any other substances recreationally? No     Social History      Tobacco Use    Smoking status: Never     Passive exposure: Never    Smokeless tobacco: Never   Vaping Use    Vaping status: Never Used   Substance Use Topics    Alcohol use: Yes     Comment: 5-7 per week    Drug use: No           8/18/2023   LAST FHS-7 RESULTS   1st degree relative breast or ovarian cancer No   Any relative bilateral breast cancer No   Any male have breast cancer No   Any ONE woman have BOTH breast AND ovarian cancer No   Any woman with breast cancer before 50yrs No   2 or more relatives with breast AND/OR ovarian cancer No   2 or more relatives with breast AND/OR bowel cancer No                6/17/2025   STI Screening   New sexual partner(s) since last STI/HIV test? No     History of abnormal Pap smear:         4/14/2016    12:00 AM 11/21/2012     2:33 PM 6/29/2011    11:18 AM   PAP / HPV   PAP (Historical) NIL  NIL  NIL            6/17/2025   Contraception/Family Planning   Questions about contraception or family planning No        Reviewed and updated as needed this visit by Provider   Tobacco  Allergies  Meds    Surg Hx             Past Medical History:   Diagnosis Date    Depressive disorder     Diabetes (H)     Endometriosis     IUD (intrauterine device) in place 2010-4/12    actinomyces seen on pap 11/2012-pt asymptomatic     NO ACTIVE PROBLEMS      Past Surgical History:   Procedure Laterality Date    GENITOURINARY SURGERY  2003    Hymen surgically cut    NO HISTORY OF SURGERY       Lab work is in process  Labs reviewed in EPIC  BP Readings from Last 3 Encounters:   06/17/25 125/70   05/01/25 123/78   04/18/25 120/84    Wt Readings from Last 3 Encounters:   06/17/25 73.5 kg (162 lb)   05/01/25 73.4 kg (161 lb 12.8 oz)   04/18/25 72.6 kg (160 lb)                  Patient Active Problem List   Diagnosis    LDL goal <160    Tinea versicolor    Mid back pain on left side    Onychomycosis    Family planning counseling    Late period    Right groin pain    Pregnancy test positive     Indication for care in labor or delivery    Vacuum extractor delivery, delivered     (normal spontaneous vaginal delivery)     Past Surgical History:   Procedure Laterality Date    GENITOURINARY SURGERY      Hymen surgically cut    NO HISTORY OF SURGERY         Social History     Tobacco Use    Smoking status: Never     Passive exposure: Never    Smokeless tobacco: Never   Substance Use Topics    Alcohol use: Yes     Comment: 5-7 per week     Family History   Problem Relation Age of Onset    Depression Mother     Eye Disorder Mother     Gynecology Mother         endometriosis    Endometriosis Mother     Hyperlipidemia Father     Hypertension Father     Eye Disorder Father     Lipids Father     Cardiovascular Maternal Grandmother     Eye Disorder Maternal Grandmother     Thyroid Disease Maternal Grandmother     Diabetes Maternal Grandfather     Hypertension Maternal Grandfather     Eye Disorder Other         self    Genitourinary Problems Other         self-    Gynecology Other         self-endometriosis    Musculoskeletal Disorder Other         self=TMJ         Current Outpatient Medications   Medication Sig Dispense Refill    azithromycin (ZITHROMAX) 250 MG tablet Take 2 tablets (500 mg) by mouth daily for 1 day, THEN 1 tablet (250 mg) daily for 4 days. 6 tablet 0    propranolol (INDERAL) 10 MG tablet Take 1 tablet by mouth 3 times daily.      propranolol (INDERAL) 20 MG tablet Take 1 tablet by mouth 3 times daily.       Allergies   Allergen Reactions    Amoxicillin Swelling and Rash    Penicillins Swelling and Rash     Recent Labs   Lab Test 25  1538 24  1543   CR 0.73  --    GFRESTIMATED >90  --    POTASSIUM 3.5  --    TSH  --  1.49          Review of Systems  Constitutional, neuro, ENT, endocrine, pulmonary, cardiac, gastrointestinal, genitourinary, musculoskeletal, integument and psychiatric systems are negative, except as otherwise noted.     Objective    Exam  /70   Pulse 97   Temp  "97.7  F (36.5  C) (Temporal)   Resp 16   Ht 1.727 m (5' 8\")   Wt 73.5 kg (162 lb)   SpO2 97%   BMI 24.63 kg/m     Estimated body mass index is 24.63 kg/m  as calculated from the following:    Height as of this encounter: 1.727 m (5' 8\").    Weight as of this encounter: 73.5 kg (162 lb).    Physical Exam  GENERAL: alert and no distress  EYES: Eyes grossly normal to inspection, PERRL and conjunctivae and sclerae normal  HENT: ear canals and TM's normal, nose and mouth without ulcers or lesions  NECK: no adenopathy, no asymmetry, masses, or scars  RESP: lungs clear to auscultation - no rales, rhonchi or wheezes  CV: regular rate and rhythm, normal S1 S2, no S3 or S4, no murmur, click or rub, no peripheral edema  ABDOMEN: soft, nontender, no hepatosplenomegaly, no masses and bowel sounds normal  MS: no gross musculoskeletal defects noted, no edema  SKIN: no suspicious lesions or rashes  NEURO: Normal strength and tone, mentation intact and speech normal  PSYCH: mentation appears normal, affect normal/bright        Signed Electronically by: Arnold Felix PA-C    "

## 2025-06-18 ENCOUNTER — RESULTS FOLLOW-UP (OUTPATIENT)
Dept: FAMILY MEDICINE | Facility: CLINIC | Age: 40
End: 2025-06-18

## 2025-08-05 ENCOUNTER — LAB REQUISITION (OUTPATIENT)
Dept: LAB | Facility: CLINIC | Age: 40
End: 2025-08-05
Payer: COMMERCIAL

## 2025-08-05 DIAGNOSIS — Z12.4 ENCOUNTER FOR SCREENING FOR MALIGNANT NEOPLASM OF CERVIX: ICD-10-CM

## 2025-08-05 PROCEDURE — G0145 SCR C/V CYTO,THINLAYER,RESCR: HCPCS | Performed by: OBSTETRICS & GYNECOLOGY

## 2025-08-05 PROCEDURE — 87624 HPV HI-RISK TYP POOLED RSLT: CPT | Performed by: OBSTETRICS & GYNECOLOGY

## 2025-08-06 LAB
HPV HR 12 DNA CVX QL NAA+PROBE: NEGATIVE
HPV16 DNA CVX QL NAA+PROBE: NEGATIVE
HPV18 DNA CVX QL NAA+PROBE: NEGATIVE
HUMAN PAPILLOMA VIRUS FINAL DIAGNOSIS: NORMAL

## 2025-08-11 LAB
BKR AP ASSOCIATED HPV REPORT: NORMAL
BKR LAB AP GYN ADEQUACY: NORMAL
BKR LAB AP GYN INTERPRETATION: NORMAL
BKR LAB AP LMP: NORMAL
BKR LAB AP PREVIOUS ABNL DX: NORMAL
BKR LAB AP PREVIOUS ABNORMAL: NORMAL
PATH REPORT.COMMENTS IMP SPEC: NORMAL
PATH REPORT.COMMENTS IMP SPEC: NORMAL
PATH REPORT.RELEVANT HX SPEC: NORMAL